# Patient Record
Sex: MALE | Race: WHITE | Employment: OTHER | URBAN - METROPOLITAN AREA
[De-identification: names, ages, dates, MRNs, and addresses within clinical notes are randomized per-mention and may not be internally consistent; named-entity substitution may affect disease eponyms.]

---

## 2017-01-12 ENCOUNTER — HOSPITAL ENCOUNTER (OUTPATIENT)
Dept: PREADMISSION TESTING | Age: 27
Discharge: HOME | End: 2017-01-12
Payer: COMMERCIAL

## 2017-01-12 VITALS
WEIGHT: 194 LBS | RESPIRATION RATE: 20 BRPM | OXYGEN SATURATION: 98 % | HEART RATE: 90 BPM | SYSTOLIC BLOOD PRESSURE: 122 MMHG | HEIGHT: 72 IN | DIASTOLIC BLOOD PRESSURE: 75 MMHG | BODY MASS INDEX: 26.28 KG/M2 | TEMPERATURE: 98 F

## 2017-01-12 RX ORDER — LIDOCAINE HYDROCHLORIDE 10 MG/ML
1 INJECTION, SOLUTION EPIDURAL; INFILTRATION; INTRACAUDAL; PERINEURAL
Status: CANCELLED | OUTPATIENT
Start: 2017-01-12 | End: 2017-01-12

## 2017-01-12 RX ORDER — SODIUM CHLORIDE 0.9 % (FLUSH) 0.9 %
10 SYRINGE (ML) INJECTION EVERY 12 HOURS SCHEDULED
Status: CANCELLED | OUTPATIENT
Start: 2017-01-12

## 2017-01-12 RX ORDER — SODIUM CHLORIDE 0.9 % (FLUSH) 0.9 %
10 SYRINGE (ML) INJECTION PRN
Status: CANCELLED | OUTPATIENT
Start: 2017-01-12

## 2017-01-12 RX ORDER — SODIUM CHLORIDE, SODIUM LACTATE, POTASSIUM CHLORIDE, CALCIUM CHLORIDE 600; 310; 30; 20 MG/100ML; MG/100ML; MG/100ML; MG/100ML
INJECTION, SOLUTION INTRAVENOUS CONTINUOUS
Status: CANCELLED | OUTPATIENT
Start: 2017-01-12

## 2017-01-17 ENCOUNTER — ANESTHESIA (OUTPATIENT)
Dept: OPERATING ROOM | Age: 27
End: 2017-01-17
Payer: COMMERCIAL

## 2017-01-17 ENCOUNTER — HOSPITAL ENCOUNTER (OUTPATIENT)
Age: 27
Setting detail: OUTPATIENT SURGERY
Discharge: HOME | End: 2017-01-17
Attending: ORTHOPAEDIC SURGERY | Admitting: ORTHOPAEDIC SURGERY
Payer: COMMERCIAL

## 2017-01-17 ENCOUNTER — ANESTHESIA EVENT (OUTPATIENT)
Dept: OPERATING ROOM | Age: 27
End: 2017-01-17
Payer: COMMERCIAL

## 2017-01-17 VITALS
RESPIRATION RATE: 16 BRPM | DIASTOLIC BLOOD PRESSURE: 61 MMHG | WEIGHT: 194 LBS | BODY MASS INDEX: 26.28 KG/M2 | OXYGEN SATURATION: 95 % | SYSTOLIC BLOOD PRESSURE: 109 MMHG | TEMPERATURE: 97.7 F | HEART RATE: 76 BPM | HEIGHT: 72 IN

## 2017-01-17 VITALS — SYSTOLIC BLOOD PRESSURE: 96 MMHG | OXYGEN SATURATION: 96 % | DIASTOLIC BLOOD PRESSURE: 46 MMHG | TEMPERATURE: 96.1 F

## 2017-01-17 PROCEDURE — 3600000004 HC SURGERY LEVEL 4 BASE: Performed by: ORTHOPAEDIC SURGERY

## 2017-01-17 PROCEDURE — 7100000011 HC PHASE II RECOVERY - ADDTL 15 MIN: Performed by: ORTHOPAEDIC SURGERY

## 2017-01-17 PROCEDURE — 97161 PT EVAL LOW COMPLEX 20 MIN: CPT

## 2017-01-17 PROCEDURE — 7100000010 HC PHASE II RECOVERY - FIRST 15 MIN: Performed by: ORTHOPAEDIC SURGERY

## 2017-01-17 PROCEDURE — 2500000003 HC RX 250 WO HCPCS: Performed by: NURSE ANESTHETIST, CERTIFIED REGISTERED

## 2017-01-17 PROCEDURE — 7100000000 HC PACU RECOVERY - FIRST 15 MIN: Performed by: ORTHOPAEDIC SURGERY

## 2017-01-17 PROCEDURE — 6360000002 HC RX W HCPCS: Performed by: ORTHOPAEDIC SURGERY

## 2017-01-17 PROCEDURE — 3600000014 HC SURGERY LEVEL 4 ADDTL 15MIN: Performed by: ORTHOPAEDIC SURGERY

## 2017-01-17 PROCEDURE — 6360000002 HC RX W HCPCS: Performed by: NURSE ANESTHETIST, CERTIFIED REGISTERED

## 2017-01-17 PROCEDURE — 3700000001 HC ADD 15 MINUTES (ANESTHESIA): Performed by: ORTHOPAEDIC SURGERY

## 2017-01-17 PROCEDURE — 3700000000 HC ANESTHESIA ATTENDED CARE: Performed by: ORTHOPAEDIC SURGERY

## 2017-01-17 PROCEDURE — 2580000003 HC RX 258: Performed by: ORTHOPAEDIC SURGERY

## 2017-01-17 PROCEDURE — 7100000001 HC PACU RECOVERY - ADDTL 15 MIN: Performed by: ORTHOPAEDIC SURGERY

## 2017-01-17 PROCEDURE — 2500000003 HC RX 250 WO HCPCS: Performed by: ORTHOPAEDIC SURGERY

## 2017-01-17 PROCEDURE — 2580000003 HC RX 258: Performed by: NURSE ANESTHETIST, CERTIFIED REGISTERED

## 2017-01-17 PROCEDURE — 2500000003 HC RX 250 WO HCPCS: Performed by: STUDENT IN AN ORGANIZED HEALTH CARE EDUCATION/TRAINING PROGRAM

## 2017-01-17 PROCEDURE — 6360000002 HC RX W HCPCS: Performed by: ANESTHESIOLOGY

## 2017-01-17 PROCEDURE — 2580000003 HC RX 258: Performed by: STUDENT IN AN ORGANIZED HEALTH CARE EDUCATION/TRAINING PROGRAM

## 2017-01-17 RX ORDER — FENTANYL CITRATE 50 UG/ML
50 INJECTION, SOLUTION INTRAMUSCULAR; INTRAVENOUS EVERY 10 MIN PRN
Status: DISCONTINUED | OUTPATIENT
Start: 2017-01-17 | End: 2017-01-17 | Stop reason: HOSPADM

## 2017-01-17 RX ORDER — BUPIVACAINE HYDROCHLORIDE AND EPINEPHRINE 5; 5 MG/ML; UG/ML
INJECTION, SOLUTION EPIDURAL; INTRACAUDAL; PERINEURAL PRN
Status: DISCONTINUED | OUTPATIENT
Start: 2017-01-17 | End: 2017-01-17 | Stop reason: HOSPADM

## 2017-01-17 RX ORDER — LIDOCAINE HYDROCHLORIDE 20 MG/ML
INJECTION, SOLUTION INFILTRATION; PERINEURAL PRN
Status: DISCONTINUED | OUTPATIENT
Start: 2017-01-17 | End: 2017-01-17 | Stop reason: SDUPTHER

## 2017-01-17 RX ORDER — HYDROCODONE BITARTRATE AND ACETAMINOPHEN 5; 325 MG/1; MG/1
1 TABLET ORAL PRN
Status: DISCONTINUED | OUTPATIENT
Start: 2017-01-17 | End: 2017-01-17 | Stop reason: HOSPADM

## 2017-01-17 RX ORDER — SODIUM CHLORIDE 0.9 % (FLUSH) 0.9 %
10 SYRINGE (ML) INJECTION EVERY 12 HOURS SCHEDULED
Status: DISCONTINUED | OUTPATIENT
Start: 2017-01-17 | End: 2017-01-17 | Stop reason: HOSPADM

## 2017-01-17 RX ORDER — MEPERIDINE HYDROCHLORIDE 25 MG/ML
12.5 INJECTION INTRAMUSCULAR; INTRAVENOUS; SUBCUTANEOUS EVERY 5 MIN PRN
Status: DISCONTINUED | OUTPATIENT
Start: 2017-01-17 | End: 2017-01-17 | Stop reason: HOSPADM

## 2017-01-17 RX ORDER — SUCCINYLCHOLINE CHLORIDE 20 MG/ML
INJECTION INTRAMUSCULAR; INTRAVENOUS PRN
Status: DISCONTINUED | OUTPATIENT
Start: 2017-01-17 | End: 2017-01-17 | Stop reason: SDUPTHER

## 2017-01-17 RX ORDER — HYDROCODONE BITARTRATE AND ACETAMINOPHEN 5; 325 MG/1; MG/1
2 TABLET ORAL PRN
Status: DISCONTINUED | OUTPATIENT
Start: 2017-01-17 | End: 2017-01-17 | Stop reason: HOSPADM

## 2017-01-17 RX ORDER — METOCLOPRAMIDE HYDROCHLORIDE 5 MG/ML
10 INJECTION INTRAMUSCULAR; INTRAVENOUS
Status: DISCONTINUED | OUTPATIENT
Start: 2017-01-17 | End: 2017-01-17 | Stop reason: HOSPADM

## 2017-01-17 RX ORDER — DEXAMETHASONE SODIUM PHOSPHATE 10 MG/ML
INJECTION INTRAMUSCULAR; INTRAVENOUS PRN
Status: DISCONTINUED | OUTPATIENT
Start: 2017-01-17 | End: 2017-01-17 | Stop reason: SDUPTHER

## 2017-01-17 RX ORDER — SODIUM CHLORIDE, SODIUM LACTATE, POTASSIUM CHLORIDE, CALCIUM CHLORIDE 600; 310; 30; 20 MG/100ML; MG/100ML; MG/100ML; MG/100ML
INJECTION, SOLUTION INTRAVENOUS CONTINUOUS PRN
Status: DISCONTINUED | OUTPATIENT
Start: 2017-01-17 | End: 2017-01-17 | Stop reason: SDUPTHER

## 2017-01-17 RX ORDER — ACETAMINOPHEN 325 MG/1
650 TABLET ORAL EVERY 4 HOURS PRN
Status: CANCELLED | OUTPATIENT
Start: 2017-01-17

## 2017-01-17 RX ORDER — MORPHINE SULFATE 4 MG/ML
4 INJECTION, SOLUTION INTRAMUSCULAR; INTRAVENOUS
Status: CANCELLED | OUTPATIENT
Start: 2017-01-17

## 2017-01-17 RX ORDER — ONDANSETRON 2 MG/ML
INJECTION INTRAMUSCULAR; INTRAVENOUS PRN
Status: DISCONTINUED | OUTPATIENT
Start: 2017-01-17 | End: 2017-01-17 | Stop reason: SDUPTHER

## 2017-01-17 RX ORDER — PROPOFOL 10 MG/ML
INJECTION, EMULSION INTRAVENOUS PRN
Status: DISCONTINUED | OUTPATIENT
Start: 2017-01-17 | End: 2017-01-17 | Stop reason: SDUPTHER

## 2017-01-17 RX ORDER — SODIUM CHLORIDE 0.9 % (FLUSH) 0.9 %
10 SYRINGE (ML) INJECTION EVERY 12 HOURS SCHEDULED
Status: CANCELLED | OUTPATIENT
Start: 2017-01-17

## 2017-01-17 RX ORDER — OXYCODONE HYDROCHLORIDE AND ACETAMINOPHEN 5; 325 MG/1; MG/1
1 TABLET ORAL EVERY 4 HOURS PRN
Status: CANCELLED | OUTPATIENT
Start: 2017-01-17

## 2017-01-17 RX ORDER — OXYCODONE HYDROCHLORIDE AND ACETAMINOPHEN 5; 325 MG/1; MG/1
2 TABLET ORAL EVERY 4 HOURS PRN
Status: CANCELLED | OUTPATIENT
Start: 2017-01-17

## 2017-01-17 RX ORDER — MAGNESIUM HYDROXIDE 1200 MG/15ML
LIQUID ORAL CONTINUOUS PRN
Status: DISCONTINUED | OUTPATIENT
Start: 2017-01-17 | End: 2017-01-17 | Stop reason: HOSPADM

## 2017-01-17 RX ORDER — FENTANYL CITRATE 50 UG/ML
INJECTION, SOLUTION INTRAMUSCULAR; INTRAVENOUS PRN
Status: DISCONTINUED | OUTPATIENT
Start: 2017-01-17 | End: 2017-01-17 | Stop reason: SDUPTHER

## 2017-01-17 RX ORDER — DIPHENHYDRAMINE HYDROCHLORIDE 50 MG/ML
12.5 INJECTION INTRAMUSCULAR; INTRAVENOUS
Status: DISCONTINUED | OUTPATIENT
Start: 2017-01-17 | End: 2017-01-17 | Stop reason: HOSPADM

## 2017-01-17 RX ORDER — SODIUM CHLORIDE 0.9 % (FLUSH) 0.9 %
10 SYRINGE (ML) INJECTION PRN
Status: DISCONTINUED | OUTPATIENT
Start: 2017-01-17 | End: 2017-01-17 | Stop reason: HOSPADM

## 2017-01-17 RX ORDER — GLYCOPYRROLATE 0.2 MG/ML
INJECTION INTRAMUSCULAR; INTRAVENOUS PRN
Status: DISCONTINUED | OUTPATIENT
Start: 2017-01-17 | End: 2017-01-17 | Stop reason: SDUPTHER

## 2017-01-17 RX ORDER — ONDANSETRON 2 MG/ML
4 INJECTION INTRAMUSCULAR; INTRAVENOUS
Status: DISCONTINUED | OUTPATIENT
Start: 2017-01-17 | End: 2017-01-17 | Stop reason: HOSPADM

## 2017-01-17 RX ORDER — MORPHINE SULFATE 2 MG/ML
2 INJECTION, SOLUTION INTRAMUSCULAR; INTRAVENOUS
Status: CANCELLED | OUTPATIENT
Start: 2017-01-17

## 2017-01-17 RX ORDER — KETOROLAC TROMETHAMINE 30 MG/ML
INJECTION, SOLUTION INTRAMUSCULAR; INTRAVENOUS PRN
Status: DISCONTINUED | OUTPATIENT
Start: 2017-01-17 | End: 2017-01-17 | Stop reason: SDUPTHER

## 2017-01-17 RX ORDER — LIDOCAINE HYDROCHLORIDE 10 MG/ML
1 INJECTION, SOLUTION EPIDURAL; INFILTRATION; INTRACAUDAL; PERINEURAL
Status: COMPLETED | OUTPATIENT
Start: 2017-01-17 | End: 2017-01-17

## 2017-01-17 RX ORDER — SODIUM CHLORIDE, SODIUM LACTATE, POTASSIUM CHLORIDE, CALCIUM CHLORIDE 600; 310; 30; 20 MG/100ML; MG/100ML; MG/100ML; MG/100ML
INJECTION, SOLUTION INTRAVENOUS CONTINUOUS
Status: DISCONTINUED | OUTPATIENT
Start: 2017-01-17 | End: 2017-01-17 | Stop reason: HOSPADM

## 2017-01-17 RX ORDER — MIDAZOLAM HYDROCHLORIDE 1 MG/ML
INJECTION INTRAMUSCULAR; INTRAVENOUS PRN
Status: DISCONTINUED | OUTPATIENT
Start: 2017-01-17 | End: 2017-01-17 | Stop reason: SDUPTHER

## 2017-01-17 RX ORDER — SODIUM CHLORIDE 0.9 % (FLUSH) 0.9 %
10 SYRINGE (ML) INJECTION PRN
Status: CANCELLED | OUTPATIENT
Start: 2017-01-17

## 2017-01-17 RX ORDER — ONDANSETRON 2 MG/ML
4 INJECTION INTRAMUSCULAR; INTRAVENOUS EVERY 6 HOURS PRN
Status: CANCELLED | OUTPATIENT
Start: 2017-01-17

## 2017-01-17 RX ADMIN — PROPOFOL 50 MG: 10 INJECTION, EMULSION INTRAVENOUS at 07:52

## 2017-01-17 RX ADMIN — FENTANYL CITRATE 25 MCG: 50 INJECTION, SOLUTION INTRAMUSCULAR; INTRAVENOUS at 07:52

## 2017-01-17 RX ADMIN — LIDOCAINE HYDROCHLORIDE 100 MG: 20 INJECTION, SOLUTION INFILTRATION; PERINEURAL at 07:31

## 2017-01-17 RX ADMIN — LIDOCAINE HYDROCHLORIDE 0.1 ML: 10 INJECTION, SOLUTION EPIDURAL; INFILTRATION; INTRACAUDAL; PERINEURAL at 06:40

## 2017-01-17 RX ADMIN — FENTANYL CITRATE 50 MCG: 50 INJECTION, SOLUTION INTRAMUSCULAR; INTRAVENOUS at 07:31

## 2017-01-17 RX ADMIN — KETOROLAC TROMETHAMINE 30 MG: 30 INJECTION, SOLUTION INTRAMUSCULAR; INTRAVENOUS at 07:35

## 2017-01-17 RX ADMIN — SODIUM CHLORIDE, POTASSIUM CHLORIDE, SODIUM LACTATE AND CALCIUM CHLORIDE: 600; 310; 30; 20 INJECTION, SOLUTION INTRAVENOUS at 06:39

## 2017-01-17 RX ADMIN — FENTANYL CITRATE 25 MCG: 50 INJECTION, SOLUTION INTRAMUSCULAR; INTRAVENOUS at 08:04

## 2017-01-17 RX ADMIN — GLYCOPYRROLATE 0.1 MG: 0.2 INJECTION INTRAMUSCULAR; INTRAVENOUS at 07:39

## 2017-01-17 RX ADMIN — SUCCINYLCHOLINE CHLORIDE 20 MG: 20 INJECTION, SOLUTION INTRAMUSCULAR; INTRAVENOUS at 08:29

## 2017-01-17 RX ADMIN — SODIUM CHLORIDE, POTASSIUM CHLORIDE, SODIUM LACTATE AND CALCIUM CHLORIDE: 600; 310; 30; 20 INJECTION, SOLUTION INTRAVENOUS at 07:18

## 2017-01-17 RX ADMIN — FENTANYL CITRATE 25 MCG: 50 INJECTION, SOLUTION INTRAMUSCULAR; INTRAVENOUS at 07:41

## 2017-01-17 RX ADMIN — SODIUM CHLORIDE, POTASSIUM CHLORIDE, SODIUM LACTATE AND CALCIUM CHLORIDE: 600; 310; 30; 20 INJECTION, SOLUTION INTRAVENOUS at 08:00

## 2017-01-17 RX ADMIN — MIDAZOLAM HYDROCHLORIDE 2 MG: 1 INJECTION, SOLUTION INTRAMUSCULAR; INTRAVENOUS at 07:25

## 2017-01-17 RX ADMIN — Medication 2 G: at 07:29

## 2017-01-17 RX ADMIN — PROPOFOL 50 MG: 10 INJECTION, EMULSION INTRAVENOUS at 07:41

## 2017-01-17 RX ADMIN — PROPOFOL 200 MG: 10 INJECTION, EMULSION INTRAVENOUS at 07:31

## 2017-01-17 RX ADMIN — DEXAMETHASONE SODIUM PHOSPHATE 8 MG: 10 INJECTION INTRAMUSCULAR; INTRAVENOUS at 07:35

## 2017-01-17 RX ADMIN — HYDROMORPHONE HYDROCHLORIDE 0.5 MG: 1 INJECTION, SOLUTION INTRAMUSCULAR; INTRAVENOUS; SUBCUTANEOUS at 09:25

## 2017-01-17 RX ADMIN — ONDANSETRON 4 MG: 2 INJECTION, SOLUTION INTRAMUSCULAR; INTRAVENOUS at 07:35

## 2017-01-17 RX ADMIN — HYDROMORPHONE HYDROCHLORIDE 0.5 MG: 1 INJECTION, SOLUTION INTRAMUSCULAR; INTRAVENOUS; SUBCUTANEOUS at 09:15

## 2017-01-17 ASSESSMENT — PAIN SCALES - GENERAL
PAINLEVEL_OUTOF10: 0
PAINLEVEL_OUTOF10: 4
PAINLEVEL_OUTOF10: 6
PAINLEVEL_OUTOF10: 3
PAINLEVEL_OUTOF10: 0

## 2017-01-17 ASSESSMENT — PAIN - FUNCTIONAL ASSESSMENT: PAIN_FUNCTIONAL_ASSESSMENT: 0-10

## 2017-02-28 ENCOUNTER — APPOINTMENT (OUTPATIENT)
Dept: GENERAL RADIOLOGY | Age: 27
End: 2017-02-28
Payer: OTHER MISCELLANEOUS

## 2017-02-28 ENCOUNTER — HOSPITAL ENCOUNTER (EMERGENCY)
Age: 27
Discharge: HOME | End: 2017-02-28
Payer: OTHER MISCELLANEOUS

## 2017-02-28 VITALS
DIASTOLIC BLOOD PRESSURE: 84 MMHG | SYSTOLIC BLOOD PRESSURE: 126 MMHG | WEIGHT: 190 LBS | TEMPERATURE: 97.9 F | RESPIRATION RATE: 17 BRPM | HEART RATE: 66 BPM | OXYGEN SATURATION: 100 % | HEIGHT: 72 IN | BODY MASS INDEX: 25.73 KG/M2

## 2017-02-28 DIAGNOSIS — S16.1XXA NECK STRAIN, INITIAL ENCOUNTER: ICD-10-CM

## 2017-02-28 DIAGNOSIS — S29.019A THORACIC MYOFASCIAL STRAIN, INITIAL ENCOUNTER: ICD-10-CM

## 2017-02-28 DIAGNOSIS — S89.92XA LEFT KNEE INJURY, INITIAL ENCOUNTER: ICD-10-CM
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
## 2017-02-28 PROCEDURE — 99284 EMERGENCY DEPT VISIT MOD MDM: CPT

## 2017-02-28 PROCEDURE — 72072 X-RAY EXAM THORAC SPINE 3VWS: CPT

## 2017-02-28 PROCEDURE — 96372 THER/PROPH/DIAG INJ SC/IM: CPT

## 2017-02-28 PROCEDURE — 72050 X-RAY EXAM NECK SPINE 4/5VWS: CPT

## 2017-02-28 PROCEDURE — 73564 X-RAY EXAM KNEE 4 OR MORE: CPT

## 2017-02-28 PROCEDURE — 73590 X-RAY EXAM OF LOWER LEG: CPT

## 2017-02-28 PROCEDURE — 6360000002 HC RX W HCPCS: Performed by: PHYSICIAN ASSISTANT

## 2017-02-28 RX ORDER — KETOROLAC TROMETHAMINE 30 MG/ML
60 INJECTION, SOLUTION INTRAMUSCULAR; INTRAVENOUS ONCE
Status: COMPLETED | OUTPATIENT
Start: 2017-02-28 | End: 2017-02-28

## 2017-02-28 RX ORDER — ORPHENADRINE CITRATE 30 MG/ML
60 INJECTION INTRAMUSCULAR; INTRAVENOUS ONCE
Status: COMPLETED | OUTPATIENT
Start: 2017-02-28 | End: 2017-02-28

## 2017-02-28 RX ORDER — IBUPROFEN 800 MG/1
800 TABLET ORAL EVERY 6 HOURS PRN
Qty: 12 TABLET | Refills: 0 | Status: SHIPPED | OUTPATIENT
Start: 2017-02-28 | End: 2017-03-02

## 2017-02-28 RX ORDER — TIZANIDINE 4 MG/1
4 TABLET ORAL NIGHTLY PRN
Qty: 4 TABLET | Refills: 0 | Status: SHIPPED | OUTPATIENT
Start: 2017-02-28 | End: 2017-03-02 | Stop reason: SDUPTHER

## 2017-02-28 RX ADMIN — ORPHENADRINE CITRATE 60 MG: 30 INJECTION INTRAMUSCULAR; INTRAVENOUS at 12:16

## 2017-02-28 RX ADMIN — KETOROLAC TROMETHAMINE 60 MG: 60 INJECTION, SOLUTION INTRAMUSCULAR at 12:16

## 2017-02-28 ASSESSMENT — PAIN DESCRIPTION - FREQUENCY: FREQUENCY: CONTINUOUS

## 2017-02-28 ASSESSMENT — PAIN SCALES - GENERAL
PAINLEVEL_OUTOF10: 8
PAINLEVEL_OUTOF10: 6

## 2017-02-28 ASSESSMENT — ENCOUNTER SYMPTOMS: BACK PAIN: 1

## 2017-02-28 ASSESSMENT — PAIN DESCRIPTION - DESCRIPTORS
DESCRIPTORS: ACHING
DESCRIPTORS: SORE;ACHING

## 2017-02-28 ASSESSMENT — PAIN DESCRIPTION - LOCATION
LOCATION: BACK;NECK
LOCATION: BACK;KNEE

## 2017-02-28 ASSESSMENT — PAIN DESCRIPTION - PAIN TYPE: TYPE: ACUTE PAIN

## 2017-02-28 ASSESSMENT — PAIN DESCRIPTION - PROGRESSION: CLINICAL_PROGRESSION: NOT CHANGED

## 2017-03-02 ENCOUNTER — OFFICE VISIT (OUTPATIENT)
Dept: FAMILY MEDICINE CLINIC | Age: 27
End: 2017-03-02

## 2017-03-02 VITALS
TEMPERATURE: 98.7 F | WEIGHT: 198 LBS | HEART RATE: 90 BPM | DIASTOLIC BLOOD PRESSURE: 90 MMHG | BODY MASS INDEX: 26.82 KG/M2 | RESPIRATION RATE: 12 BRPM | SYSTOLIC BLOOD PRESSURE: 140 MMHG | HEIGHT: 72 IN | OXYGEN SATURATION: 98 %

## 2017-03-02 DIAGNOSIS — S23.9XXA THORACIC BACK SPRAIN, INITIAL ENCOUNTER: Primary | ICD-10-CM

## 2017-03-02 DIAGNOSIS — M54.6 ACUTE BILATERAL THORACIC BACK PAIN: ICD-10-CM

## 2017-03-02 DIAGNOSIS — S80.02XA CONTUSION OF LEFT KNEE, INITIAL ENCOUNTER: ICD-10-CM
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
## 2017-03-02 DIAGNOSIS — M25.562 ACUTE PAIN OF LEFT KNEE: ICD-10-CM

## 2017-03-02 DIAGNOSIS — M54.2 NECK PAIN, ACUTE: ICD-10-CM

## 2017-03-02 DIAGNOSIS — S16.1XXA CERVICAL STRAIN, ACUTE, INITIAL ENCOUNTER: ICD-10-CM

## 2017-03-02 PROCEDURE — 99213 OFFICE O/P EST LOW 20 MIN: CPT | Performed by: FAMILY MEDICINE

## 2017-03-02 RX ORDER — TIZANIDINE 4 MG/1
4 TABLET ORAL NIGHTLY PRN
Qty: 30 TABLET | Refills: 2 | Status: SHIPPED | OUTPATIENT
Start: 2017-03-02 | End: 2017-06-19 | Stop reason: SDUPTHER

## 2017-03-02 RX ORDER — ETODOLAC 400 MG/1
400 TABLET, FILM COATED ORAL 2 TIMES DAILY
Qty: 60 TABLET | Refills: 3 | Status: SHIPPED | OUTPATIENT
Start: 2017-03-02 | End: 2017-06-19

## 2017-03-02 ASSESSMENT — ENCOUNTER SYMPTOMS
ABDOMINAL PAIN: 0
VISUAL CHANGE: 0
ALLERGIC/IMMUNOLOGIC NEGATIVE: 1
BACK PAIN: 1
GASTROINTESTINAL NEGATIVE: 1
PHOTOPHOBIA: 0
RESPIRATORY NEGATIVE: 1
EYES NEGATIVE: 1
BOWEL INCONTINENCE: 0
TROUBLE SWALLOWING: 0

## 2017-06-19 ENCOUNTER — OFFICE VISIT (OUTPATIENT)
Dept: FAMILY MEDICINE CLINIC | Age: 27
End: 2017-06-19

## 2017-06-19 VITALS
HEART RATE: 100 BPM | TEMPERATURE: 98.5 F | BODY MASS INDEX: 26.41 KG/M2 | WEIGHT: 195 LBS | SYSTOLIC BLOOD PRESSURE: 136 MMHG | OXYGEN SATURATION: 98 % | DIASTOLIC BLOOD PRESSURE: 80 MMHG | RESPIRATION RATE: 16 BRPM | HEIGHT: 72 IN

## 2017-06-19 DIAGNOSIS — M54.42 LEFT-SIDED LOW BACK PAIN WITH LEFT-SIDED SCIATICA, UNSPECIFIED CHRONICITY: Primary | ICD-10-CM

## 2017-06-19 DIAGNOSIS — K21.9 GASTROESOPHAGEAL REFLUX DISEASE, ESOPHAGITIS PRESENCE NOT SPECIFIED: ICD-10-CM

## 2017-06-19 PROCEDURE — 99213 OFFICE O/P EST LOW 20 MIN: CPT | Performed by: FAMILY MEDICINE

## 2017-06-19 RX ORDER — PREDNISONE 10 MG/1
30 TABLET ORAL DAILY
Qty: 30 TABLET | Refills: 0 | Status: SHIPPED | OUTPATIENT
Start: 2017-06-19 | End: 2017-06-29

## 2017-06-19 RX ORDER — RANITIDINE 150 MG/1
150 TABLET ORAL 2 TIMES DAILY
Qty: 60 TABLET | Refills: 5 | Status: SHIPPED | OUTPATIENT
Start: 2017-06-19

## 2017-06-19 RX ORDER — TIZANIDINE 4 MG/1
4 TABLET ORAL NIGHTLY PRN
Qty: 30 TABLET | Refills: 2 | Status: SHIPPED | OUTPATIENT
Start: 2017-06-19 | End: 2017-11-15 | Stop reason: SDUPTHER

## 2017-06-19 ASSESSMENT — PATIENT HEALTH QUESTIONNAIRE - PHQ9
1. LITTLE INTEREST OR PLEASURE IN DOING THINGS: 0
2. FEELING DOWN, DEPRESSED OR HOPELESS: 0
SUM OF ALL RESPONSES TO PHQ9 QUESTIONS 1 & 2: 0
SUM OF ALL RESPONSES TO PHQ QUESTIONS 1-9: 0

## 2017-06-19 ASSESSMENT — ENCOUNTER SYMPTOMS
BOWEL INCONTINENCE: 0
ABDOMINAL PAIN: 0
BACK PAIN: 1

## 2017-06-29 ENCOUNTER — HOSPITAL ENCOUNTER (OUTPATIENT)
Dept: PHYSICAL THERAPY | Age: 27
Setting detail: THERAPIES SERIES
Discharge: HOME | End: 2017-06-29
Payer: COMMERCIAL

## 2017-06-29 PROCEDURE — 97162 PT EVAL MOD COMPLEX 30 MIN: CPT

## 2017-06-29 ASSESSMENT — PAIN SCALES - GENERAL: PAINLEVEL_OUTOF10: 5

## 2017-06-29 ASSESSMENT — PAIN DESCRIPTION - ORIENTATION: ORIENTATION: LOWER;LEFT

## 2017-06-29 ASSESSMENT — PAIN DESCRIPTION - LOCATION: LOCATION: BACK

## 2017-07-10 ENCOUNTER — HOSPITAL ENCOUNTER (OUTPATIENT)
Dept: PHYSICAL THERAPY | Age: 27
Setting detail: THERAPIES SERIES
Discharge: HOME | End: 2017-07-10
Payer: COMMERCIAL

## 2017-07-10 PROCEDURE — 97110 THERAPEUTIC EXERCISES: CPT

## 2017-07-10 PROCEDURE — 97140 MANUAL THERAPY 1/> REGIONS: CPT

## 2017-07-10 ASSESSMENT — PAIN DESCRIPTION - PAIN TYPE: TYPE: CHRONIC PAIN

## 2017-07-10 ASSESSMENT — PAIN DESCRIPTION - ORIENTATION: ORIENTATION: LEFT

## 2017-07-10 ASSESSMENT — PAIN SCALES - GENERAL: PAINLEVEL_OUTOF10: 4

## 2017-07-10 ASSESSMENT — PAIN DESCRIPTION - LOCATION: LOCATION: BACK;HIP

## 2017-07-12 ENCOUNTER — HOSPITAL ENCOUNTER (OUTPATIENT)
Dept: PHYSICAL THERAPY | Age: 27
Setting detail: THERAPIES SERIES
Discharge: HOME | End: 2017-07-12
Payer: COMMERCIAL

## 2017-07-12 PROCEDURE — 97110 THERAPEUTIC EXERCISES: CPT

## 2017-07-12 PROCEDURE — 97140 MANUAL THERAPY 1/> REGIONS: CPT

## 2017-07-12 ASSESSMENT — PAIN DESCRIPTION - FREQUENCY: FREQUENCY: CONTINUOUS

## 2017-07-12 ASSESSMENT — PAIN DESCRIPTION - LOCATION: LOCATION: BACK;BUTTOCKS;HIP

## 2017-07-12 ASSESSMENT — PAIN DESCRIPTION - DESCRIPTORS: DESCRIPTORS: NUMBNESS;TINGLING;STABBING

## 2017-07-12 ASSESSMENT — PAIN DESCRIPTION - PAIN TYPE: TYPE: CHRONIC PAIN

## 2017-07-12 ASSESSMENT — PAIN DESCRIPTION - ORIENTATION: ORIENTATION: LEFT

## 2017-07-12 ASSESSMENT — PAIN SCALES - GENERAL: PAINLEVEL_OUTOF10: 8

## 2017-07-18 ENCOUNTER — HOSPITAL ENCOUNTER (OUTPATIENT)
Dept: PHYSICAL THERAPY | Age: 27
Setting detail: THERAPIES SERIES
Discharge: HOME | End: 2017-07-18
Payer: COMMERCIAL

## 2017-07-25 ENCOUNTER — HOSPITAL ENCOUNTER (OUTPATIENT)
Dept: PHYSICAL THERAPY | Age: 27
Setting detail: THERAPIES SERIES
Discharge: HOME | End: 2017-07-25
Payer: COMMERCIAL

## 2017-07-27 ENCOUNTER — HOSPITAL ENCOUNTER (OUTPATIENT)
Dept: PHYSICAL THERAPY | Age: 27
Setting detail: THERAPIES SERIES
End: 2017-07-27
Payer: COMMERCIAL

## 2017-07-31 ENCOUNTER — HOSPITAL ENCOUNTER (OUTPATIENT)
Dept: PHYSICAL THERAPY | Age: 27
Setting detail: THERAPIES SERIES
Discharge: HOME | End: 2017-07-31
Payer: COMMERCIAL

## 2017-07-31 PROCEDURE — 97110 THERAPEUTIC EXERCISES: CPT

## 2017-07-31 PROCEDURE — G0283 ELEC STIM OTHER THAN WOUND: HCPCS

## 2017-07-31 PROCEDURE — 97140 MANUAL THERAPY 1/> REGIONS: CPT

## 2017-07-31 ASSESSMENT — PAIN DESCRIPTION - ORIENTATION: ORIENTATION: LOWER;LEFT

## 2017-07-31 ASSESSMENT — PAIN SCALES - GENERAL: PAINLEVEL_OUTOF10: 7

## 2017-07-31 ASSESSMENT — PAIN DESCRIPTION - DESCRIPTORS: DESCRIPTORS: STABBING;NUMBNESS

## 2017-07-31 ASSESSMENT — PAIN DESCRIPTION - LOCATION: LOCATION: BACK;BUTTOCKS;HIP

## 2017-07-31 ASSESSMENT — PAIN DESCRIPTION - PAIN TYPE: TYPE: CHRONIC PAIN

## 2017-08-03 ENCOUNTER — APPOINTMENT (OUTPATIENT)
Dept: PHYSICAL THERAPY | Age: 27
End: 2017-08-03
Payer: COMMERCIAL

## 2017-08-07 ENCOUNTER — HOSPITAL ENCOUNTER (OUTPATIENT)
Dept: PHYSICAL THERAPY | Age: 27
Setting detail: THERAPIES SERIES
Discharge: HOME | End: 2017-08-07
Payer: COMMERCIAL

## 2017-08-07 PROCEDURE — G0283 ELEC STIM OTHER THAN WOUND: HCPCS

## 2017-08-07 PROCEDURE — 97140 MANUAL THERAPY 1/> REGIONS: CPT

## 2017-08-07 PROCEDURE — 97110 THERAPEUTIC EXERCISES: CPT

## 2017-08-07 ASSESSMENT — PAIN DESCRIPTION - LOCATION: LOCATION: BACK;HIP

## 2017-08-07 ASSESSMENT — PAIN DESCRIPTION - ORIENTATION: ORIENTATION: LOWER;LEFT

## 2017-08-07 ASSESSMENT — PAIN DESCRIPTION - PAIN TYPE: TYPE: CHRONIC PAIN

## 2017-08-07 ASSESSMENT — PAIN DESCRIPTION - DESCRIPTORS: DESCRIPTORS: ACHING

## 2017-08-07 ASSESSMENT — PAIN SCALES - GENERAL: PAINLEVEL_OUTOF10: 4

## 2017-08-10 ENCOUNTER — HOSPITAL ENCOUNTER (OUTPATIENT)
Dept: PHYSICAL THERAPY | Age: 27
Setting detail: THERAPIES SERIES
Discharge: HOME | End: 2017-08-10
Payer: COMMERCIAL

## 2017-10-05 ENCOUNTER — CLINICAL DOCUMENTATION (OUTPATIENT)
Dept: PHYSICAL THERAPY | Age: 27
End: 2017-10-05

## 2017-10-05 NOTE — PROGRESS NOTES
Frankie hudson [address]                                                                Ph: [phone]  Fax: [phone]     [] Certification  [] Recertification            []  Plan of Care  [] Progress Note [x] Discharge      To:  Referring Practitioner: Dr. William Cain                 From:  Kelechi Mckee, PT  Patient: Darnell Nathan     : 1990  Diagnosis: Left sided LBP with Left sided sciatica     Date: 10/5/2017  Treatment Diagnosis: LBP     Progress Report Period from:  2017  to 8/15/2017     Total # of Visits to Date: 5   No Show: 1    Canceled Appointment: 3      OBJECTIVE:   Short Term Goals - Time Frame for Short term goals: 2 weeks    Goals Current/Discharge status  Met   Short term goal 1: Independent with HEP  compliant [x] yes  [] no   Short term goal 2: Demonstrates correct pelvic alignment >50% of the time. NT secondary to unexpected D/C [] yes  [] no      Long Term Goals - Time Frame for Long term goals : 4-5 weeks  Goals Current/ Discharge status Met   Long term goal 1: Improve lumbar ROM to WNL to improve ADLs and mobility. Spine  Lumbar: flex 45, ext 28, SB RB 15 degrees(17) [] yes  [x] no   Long term goal 2: Reduce LBP to </= 3/10 with all activity. 4/10 (17) [] yes  [] no   Long term goal 3: Improve daniel LE strength to 5/5 to improve stability and maintain pelvic alignment. NT secondary to unexpected D/C [] yes  [] no   Long term goal 4: Oswestry </ = 10/50 NT secondary to unexpected D/C [] yes  [] no      Assessment: Unable to determine functional outcomes secondary to pt failure to return to PT after 17; unexpected D/C     PLAN: D/C     Patient Status:[] Continue/ Initiate plan of Care                                              [x] Discharge PT. Recommend pt continue with HEP.                                                [] Additional visits requested, Please

## 2017-11-15 ENCOUNTER — OFFICE VISIT (OUTPATIENT)
Dept: FAMILY MEDICINE CLINIC | Age: 27
End: 2017-11-15

## 2017-11-15 VITALS
DIASTOLIC BLOOD PRESSURE: 76 MMHG | HEIGHT: 72 IN | SYSTOLIC BLOOD PRESSURE: 128 MMHG | RESPIRATION RATE: 12 BRPM | TEMPERATURE: 97.9 F | WEIGHT: 206 LBS | BODY MASS INDEX: 27.9 KG/M2

## 2017-11-15 DIAGNOSIS — M54.42 LEFT-SIDED LOW BACK PAIN WITH LEFT-SIDED SCIATICA, UNSPECIFIED CHRONICITY: ICD-10-CM

## 2017-11-15 DIAGNOSIS — B02.9 HERPES ZOSTER WITHOUT COMPLICATION: Primary | ICD-10-CM

## 2017-11-15 PROCEDURE — G8417 CALC BMI ABV UP PARAM F/U: HCPCS | Performed by: FAMILY MEDICINE

## 2017-11-15 PROCEDURE — 99213 OFFICE O/P EST LOW 20 MIN: CPT | Performed by: FAMILY MEDICINE

## 2017-11-15 PROCEDURE — G8484 FLU IMMUNIZE NO ADMIN: HCPCS | Performed by: FAMILY MEDICINE

## 2017-11-15 PROCEDURE — 4004F PT TOBACCO SCREEN RCVD TLK: CPT | Performed by: FAMILY MEDICINE

## 2017-11-15 PROCEDURE — G8427 DOCREV CUR MEDS BY ELIG CLIN: HCPCS | Performed by: FAMILY MEDICINE

## 2017-11-15 RX ORDER — GABAPENTIN 300 MG/1
300 CAPSULE ORAL 3 TIMES DAILY
Qty: 90 CAPSULE | Refills: 0 | Status: SHIPPED | OUTPATIENT
Start: 2017-11-15 | End: 2017-12-21 | Stop reason: SDUPTHER

## 2017-11-15 RX ORDER — TIZANIDINE 4 MG/1
4 TABLET ORAL NIGHTLY PRN
Qty: 30 TABLET | Refills: 2 | Status: SHIPPED | OUTPATIENT
Start: 2017-11-15

## 2017-11-15 RX ORDER — ACYCLOVIR 400 MG/1
TABLET ORAL
Refills: 0 | COMMUNITY
Start: 2017-11-14 | End: 2017-11-15

## 2017-11-15 RX ORDER — ACYCLOVIR 800 MG/1
800 TABLET ORAL
Qty: 35 TABLET | Refills: 0 | Status: SHIPPED | OUTPATIENT
Start: 2017-11-15 | End: 2017-11-22

## 2017-11-15 ASSESSMENT — ENCOUNTER SYMPTOMS
RHINORRHEA: 0
SORE THROAT: 0
VOMITING: 0
EYE PAIN: 0
RESPIRATORY NEGATIVE: 1
COUGH: 0
GASTROINTESTINAL NEGATIVE: 1
DIARRHEA: 0
SHORTNESS OF BREATH: 0
NAIL CHANGES: 0
BACK PAIN: 1

## 2017-11-15 NOTE — PROGRESS NOTES
back, abd     Social History     Social History    Marital status:      Spouse name: N/A    Number of children: N/A    Years of education: N/A     Occupational History    Not on file. Social History Main Topics    Smoking status: Current Every Day Smoker     Packs/day: 1.00    Smokeless tobacco: Never Used    Alcohol use Yes      Comment: Occas    Drug use:      Types: Marijuana      Comment: daily    Sexual activity: Not on file     Other Topics Concern    Not on file     Social History Narrative    No narrative on file     Family History   Problem Relation Age of Onset    Other Mother      thyroid    Mental Illness Mother      bipolar ADH    Mental Illness Father      bipolar, adhd    No Known Problems Daughter      Allergies   Allergen Reactions    Allegra [Fexofenadine] Other (See Comments)     Back pain    Bee Venom     Fexofenadine Hcl     Fexofenadine-Pseudoephed Er     Tape [Adhesive Tape]      Current Outpatient Prescriptions on File Prior to Visit   Medication Sig Dispense Refill    ranitidine (ZANTAC) 150 MG tablet Take 1 tablet by mouth 2 times daily 60 tablet 5    albuterol sulfate HFA (VENTOLIN HFA) 108 (90 BASE) MCG/ACT inhaler Inhale 2 puffs into the lungs every 6 hours as needed for Wheezing (Patient taking differently: Inhale 2 puffs into the lungs every 6 hours as needed for Wheezing Pt to use AM of OR) 1 Inhaler 3    diphenhydrAMINE (BENADRYL) 25 MG capsule Take 50 mg by mouth every 6 hours as needed for Itching. No current facility-administered medications on file prior to visit. Objective    Vitals:    11/15/17 1631   BP: 128/76   Site: Left Arm   Position: Sitting   Cuff Size: Medium Adult   Resp: 12   Temp: 97.9 °F (36.6 °C)   TempSrc: Tympanic   Weight: 206 lb (93.4 kg)   Height: 6' (1.829 m)     Physical Exam   Constitutional: He is oriented to person, place, and time. He appears well-developed and well-nourished. No distress.    HENT:   Head:

## 2017-11-22 ENCOUNTER — TELEPHONE (OUTPATIENT)
Dept: PRIMARY CARE CLINIC | Age: 27
End: 2017-11-22

## 2017-11-27 ENCOUNTER — TELEPHONE (OUTPATIENT)
Dept: FAMILY MEDICINE CLINIC | Age: 27
End: 2017-11-27

## 2017-12-21 ENCOUNTER — OFFICE VISIT (OUTPATIENT)
Dept: FAMILY MEDICINE CLINIC | Age: 27
End: 2017-12-21

## 2017-12-21 VITALS
BODY MASS INDEX: 28.17 KG/M2 | OXYGEN SATURATION: 99 % | DIASTOLIC BLOOD PRESSURE: 70 MMHG | RESPIRATION RATE: 16 BRPM | WEIGHT: 208 LBS | SYSTOLIC BLOOD PRESSURE: 110 MMHG | TEMPERATURE: 99.1 F | HEIGHT: 72 IN | HEART RATE: 86 BPM

## 2017-12-21 DIAGNOSIS — M54.50 CHRONIC BILATERAL LOW BACK PAIN WITHOUT SCIATICA: ICD-10-CM

## 2017-12-21 DIAGNOSIS — B02.29 POSTHERPETIC NEURALGIA: Primary | ICD-10-CM

## 2017-12-21 DIAGNOSIS — G89.29 CHRONIC BILATERAL LOW BACK PAIN WITHOUT SCIATICA: ICD-10-CM

## 2017-12-21 PROCEDURE — G8417 CALC BMI ABV UP PARAM F/U: HCPCS | Performed by: FAMILY MEDICINE

## 2017-12-21 PROCEDURE — 4004F PT TOBACCO SCREEN RCVD TLK: CPT | Performed by: FAMILY MEDICINE

## 2017-12-21 PROCEDURE — G8427 DOCREV CUR MEDS BY ELIG CLIN: HCPCS | Performed by: FAMILY MEDICINE

## 2017-12-21 PROCEDURE — G8484 FLU IMMUNIZE NO ADMIN: HCPCS | Performed by: FAMILY MEDICINE

## 2017-12-21 PROCEDURE — 99213 OFFICE O/P EST LOW 20 MIN: CPT | Performed by: FAMILY MEDICINE

## 2017-12-21 RX ORDER — LIDOCAINE 50 MG/G
2 PATCH TOPICAL DAILY
Qty: 60 PATCH | Refills: 0 | Status: SHIPPED | OUTPATIENT
Start: 2017-12-21

## 2017-12-21 RX ORDER — GABAPENTIN 300 MG/1
300 CAPSULE ORAL 3 TIMES DAILY
Qty: 90 CAPSULE | Refills: 5 | Status: SHIPPED | OUTPATIENT
Start: 2017-12-21

## 2017-12-21 ASSESSMENT — ENCOUNTER SYMPTOMS
BACK PAIN: 1
ABDOMINAL PAIN: 0
BOWEL INCONTINENCE: 0

## 2017-12-21 NOTE — PROGRESS NOTES
Subjective  Daivd Juliette, 32 y.o. male presents today with:  Chief Complaint   Patient presents with    Arm Pain     pain from shingles     Back Pain     wants something stronger for back pain        Back Pain   This is a chronic problem. The current episode started more than 1 month ago. The problem occurs constantly. The problem is unchanged. The pain is present in the sacro-iliac and lumbar spine. The quality of the pain is described as aching. The pain does not radiate. The pain is severe. The pain is the same all the time. The symptoms are aggravated by standing, twisting, position, bending and coughing. Stiffness is present all day. Pertinent negatives include no abdominal pain, bladder incontinence, bowel incontinence, chest pain, dysuria, fever, headaches, leg pain, numbness (Right arm burning paresthesias), paresis, paresthesias, pelvic pain, perianal numbness, tingling, weakness or weight loss. He has tried analgesics, bed rest, heat, ice, muscle relaxant, NSAIDs and home exercises for the symptoms. The treatment provided mild relief. Review of Systems   Constitutional: Negative for fever and weight loss. Cardiovascular: Negative for chest pain. Gastrointestinal: Negative for abdominal pain and bowel incontinence. Genitourinary: Negative for bladder incontinence, dysuria and pelvic pain. Musculoskeletal: Positive for back pain. Neurological: Negative for tingling, weakness, numbness (Right arm burning paresthesias), headaches and paresthesias.          Past Medical History:   Diagnosis Date    ADHD (attention deficit hyperactivity disorder)     Allergic rhinitis     Seasonal allergies    Asthma     Bipolar disorder ([address]     May or May not have    Hyperlipidemia     off tx since weight loss 2 yrs ago    Hypertension     BP normal after weight los 2 yrs ago    PTSD (post-traumatic stress disorder) 2008     Past Surgical History:   Procedure Laterality Date    ARTHROSCOPY / ARTHROTOMY KNEE Left 1/17/2017    LT KNEE ARTHROSCOPY MEDIAL MENISCECTOMY, POSS MENISCUS REPAIR SUPINE ARTHREX MENISCUS REPAIR performed by Caren Tejeda MD at [address]      Right foot, left leg, upper right back, abd     Social History     Social History    Marital status:      Spouse name: N/A    Number of children: N/A    Years of education: N/A     Occupational History    Not on file. Social History Main Topics    Smoking status: Current Every Day Smoker     Packs/day: 1.00    Smokeless tobacco: Never Used    Alcohol use Yes      Comment: Occas    Drug use: Yes     Types: Marijuana      Comment: daily    Sexual activity: Not on file     Other Topics Concern    Not on file     Social History Narrative    No narrative on file     Family History   Problem Relation Age of Onset    Other Mother      thyroid    Mental Illness Mother      bipolar ADH    Mental Illness Father      bipolar, adhd    No Known Problems Daughter      Allergies   Allergen Reactions    Allegra [Fexofenadine] Other (See Comments)     Back pain    Bee Venom     Fexofenadine Hcl     Fexofenadine-Pseudoephed Er     Tape [Adhesive Tape]      Current Outpatient Prescriptions on File Prior to Visit   Medication Sig Dispense Refill    tiZANidine (ZANAFLEX) 4 MG tablet Take 1 tablet by mouth nightly as needed (for severe spasm) 30 tablet 2    Tens Unit MISC by Does not apply route 1 each 0    ranitidine (ZANTAC) 150 MG tablet Take 1 tablet by mouth 2 times daily 60 tablet 5    albuterol sulfate HFA (VENTOLIN HFA) 108 (90 BASE) MCG/ACT inhaler Inhale 2 puffs into the lungs every 6 hours as needed for Wheezing (Patient taking differently: Inhale 2 puffs into the lungs every 6 hours as needed for Wheezing Pt to use AM of OR) 1 Inhaler 3    diphenhydrAMINE (BENADRYL) 25 MG capsule Take 50 mg by mouth every 6 hours as needed for Itching.        No current facility-administered medications on file

## 2018-01-04 ENCOUNTER — TELEPHONE (OUTPATIENT)
Dept: FAMILY MEDICINE CLINIC | Age: 28
End: 2018-01-04

## 2018-01-05 NOTE — TELEPHONE ENCOUNTER
Patient aware and will be contacting DOMINICK Da Silva in [facility] to verify they have correct fax.

## 2019-04-15 ENCOUNTER — TELEPHONE (OUTPATIENT)
Dept: FAMILY MEDICINE CLINIC | Age: 29
End: 2019-04-15

## 2021-07-10 ENCOUNTER — APPOINTMENT (OUTPATIENT)
Dept: GENERAL RADIOLOGY | Age: 31
End: 2021-07-10
Payer: COMMERCIAL

## 2021-07-10 ENCOUNTER — APPOINTMENT (OUTPATIENT)
Dept: CT IMAGING | Age: 31
End: 2021-07-10
Payer: COMMERCIAL

## 2021-07-10 ENCOUNTER — HOSPITAL ENCOUNTER (EMERGENCY)
Age: 31
Discharge: HOME | End: 2021-07-10
Attending: EMERGENCY MEDICINE
Payer: COMMERCIAL

## 2021-07-10 VITALS
DIASTOLIC BLOOD PRESSURE: 76 MMHG | SYSTOLIC BLOOD PRESSURE: 136 MMHG | BODY MASS INDEX: 27.09 KG/M2 | WEIGHT: 200 LBS | RESPIRATION RATE: 18 BRPM | TEMPERATURE: 96.2 F | OXYGEN SATURATION: 100 % | HEART RATE: 88 BPM | HEIGHT: 72 IN
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
## 2021-07-10 DIAGNOSIS — T22.211A PARTIAL THICKNESS BURN OF RIGHT FOREARM, INITIAL ENCOUNTER: ICD-10-CM

## 2021-07-10 DIAGNOSIS — S49.92XA INJURY OF LEFT ACROMIOCLAVICULAR JOINT, INITIAL ENCOUNTER: ICD-10-CM

## 2021-07-10 LAB
ALBUMIN SERPL-MCNC: 4.9 G/DL (ref 3.5–4.6)
ALP BLD-CCNC: 79 U/L (ref 35–104)
ALT SERPL-CCNC: 17 U/L (ref 0–41)
ANION GAP SERPL CALCULATED.3IONS-SCNC: 15 MEQ/L (ref 9–15)
AST SERPL-CCNC: 19 U/L (ref 0–40)
BASOPHILS ABSOLUTE: 0.1 K/UL (ref 0–0.1)
BASOPHILS RELATIVE PERCENT: 0.5 % (ref 0.2–1.2)
BILIRUB SERPL-MCNC: 0.4 MG/DL (ref 0.2–0.7)
BUN BLDV-MCNC: 18 MG/DL (ref 6–20)
CALCIUM SERPL-MCNC: 9.2 MG/DL (ref 8.5–9.9)
CHLORIDE BLD-SCNC: 105 MEQ/L (ref 95–107)
CO2: 22 MEQ/L (ref 20–31)
CREAT SERPL-MCNC: 1.02 MG/DL (ref 0.7–1.2)
EOSINOPHILS ABSOLUTE: 0.3 K/UL (ref 0–0.5)
EOSINOPHILS RELATIVE PERCENT: 1.9 % (ref 0.8–7)
GFR AFRICAN AMERICAN: >60
GFR NON-AFRICAN AMERICAN: >60
GLOBULIN: 2.6 G/DL (ref 2.3–3.5)
GLUCOSE BLD-MCNC: 107 MG/DL (ref 70–99)
HCT VFR BLD CALC: 41.3 % (ref 42–52)
HEMOGLOBIN: 14.2 G/DL (ref 13.7–17.5)
IMMATURE GRANULOCYTES #: 0 K/UL
IMMATURE GRANULOCYTES %: 0.3 %
LYMPHOCYTES ABSOLUTE: 6.3 K/UL (ref 1.3–3.6)
LYMPHOCYTES RELATIVE PERCENT: 47.6 %
MCH RBC QN AUTO: 30.4 PG (ref 25.7–32.2)
MCHC RBC AUTO-ENTMCNC: 34.4 % (ref 32.3–36.5)
MCV RBC AUTO: 88.4 FL (ref 79–92.2)
MONOCYTES ABSOLUTE: 1 K/UL (ref 0.3–0.8)
MONOCYTES RELATIVE PERCENT: 7.4 % (ref 5.3–12.2)
NEUTROPHILS ABSOLUTE: 5.6 K/UL (ref 1.8–5.4)
NEUTROPHILS RELATIVE PERCENT: 42.3 % (ref 34–67.9)
PDW BLD-RTO: 12.5 % (ref 11.6–14.4)
PLATELET # BLD: 272 K/UL (ref 163–337)
POTASSIUM SERPL-SCNC: 3.7 MEQ/L (ref 3.4–4.9)
RBC # BLD: 4.67 M/UL (ref 4.63–6.08)
SLIDE REVIEW: ABNORMAL
SODIUM BLD-SCNC: 142 MEQ/L (ref 135–144)
TOTAL PROTEIN: 7.5 G/DL (ref 6.3–8)
WBC # BLD: 13.2 K/UL (ref 4.2–9)

## 2021-07-10 PROCEDURE — 99284 EMERGENCY DEPT VISIT MOD MDM: CPT

## 2021-07-10 PROCEDURE — 70450 CT HEAD/BRAIN W/O DYE: CPT

## 2021-07-10 PROCEDURE — 73610 X-RAY EXAM OF ANKLE: CPT

## 2021-07-10 PROCEDURE — 73030 X-RAY EXAM OF SHOULDER: CPT

## 2021-07-10 PROCEDURE — 36415 COLL VENOUS BLD VENIPUNCTURE: CPT

## 2021-07-10 PROCEDURE — 6360000002 HC RX W HCPCS: Performed by: EMERGENCY MEDICINE

## 2021-07-10 PROCEDURE — 80053 COMPREHEN METABOLIC PANEL: CPT

## 2021-07-10 PROCEDURE — 2580000003 HC RX 258: Performed by: EMERGENCY MEDICINE

## 2021-07-10 PROCEDURE — 72125 CT NECK SPINE W/O DYE: CPT

## 2021-07-10 PROCEDURE — 85025 COMPLETE CBC W/AUTO DIFF WBC: CPT

## 2021-07-10 PROCEDURE — 96374 THER/PROPH/DIAG INJ IV PUSH: CPT

## 2021-07-10 RX ORDER — KETOROLAC TROMETHAMINE 30 MG/ML
60 INJECTION, SOLUTION INTRAMUSCULAR; INTRAVENOUS ONCE
Status: DISCONTINUED | OUTPATIENT
Start: 2021-07-10 | End: 2021-07-10

## 2021-07-10 RX ORDER — KETOROLAC TROMETHAMINE 30 MG/ML
30 INJECTION, SOLUTION INTRAMUSCULAR; INTRAVENOUS ONCE
Status: COMPLETED | OUTPATIENT
Start: 2021-07-10 | End: 2021-07-10

## 2021-07-10 RX ORDER — KETOROLAC TROMETHAMINE 10 MG/1
10 TABLET, FILM COATED ORAL EVERY 6 HOURS PRN
Qty: 20 TABLET | Refills: 0 | Status: SHIPPED | OUTPATIENT
Start: 2021-07-10

## 2021-07-10 RX ORDER — 0.9 % SODIUM CHLORIDE 0.9 %
1000 INTRAVENOUS SOLUTION INTRAVENOUS ONCE
Status: COMPLETED | OUTPATIENT
Start: 2021-07-10 | End: 2021-07-10

## 2021-07-10 RX ORDER — CYCLOBENZAPRINE HCL 10 MG
10 TABLET ORAL 3 TIMES DAILY PRN
Qty: 30 TABLET | Refills: 0 | Status: SHIPPED | OUTPATIENT
Start: 2021-07-10 | End: 2021-07-20

## 2021-07-10 RX ADMIN — KETOROLAC TROMETHAMINE 30 MG: 30 INJECTION, SOLUTION INTRAMUSCULAR at 14:40

## 2021-07-10 RX ADMIN — SODIUM CHLORIDE 1000 ML: 9 INJECTION, SOLUTION INTRAVENOUS at 15:20

## 2021-07-10 ASSESSMENT — PAIN SCALES - GENERAL
PAINLEVEL_OUTOF10: 10
PAINLEVEL_OUTOF10: 10
PAINLEVEL_OUTOF10: 6

## 2021-07-10 ASSESSMENT — ENCOUNTER SYMPTOMS
NAUSEA: 0
ABDOMINAL PAIN: 0
DIARRHEA: 0
SHORTNESS OF BREATH: 0
VOMITING: 0
CONSTIPATION: 0
SORE THROAT: 0
COLOR CHANGE: 0
BACK PAIN: 0
COUGH: 0
EYE PAIN: 0
SINUS PRESSURE: 0
APNEA: 0
PHOTOPHOBIA: 0
RHINORRHEA: 0
WHEEZING: 0
ABDOMINAL DISTENTION: 0

## 2021-07-10 ASSESSMENT — PAIN DESCRIPTION - ORIENTATION
ORIENTATION: RIGHT;LEFT
ORIENTATION: RIGHT;LEFT

## 2021-07-10 ASSESSMENT — PAIN DESCRIPTION - ONSET
ONSET: SUDDEN
ONSET: ON-GOING

## 2021-07-10 ASSESSMENT — PAIN DESCRIPTION - PROGRESSION: CLINICAL_PROGRESSION: GRADUALLY IMPROVING

## 2021-07-10 ASSESSMENT — PAIN DESCRIPTION - PAIN TYPE: TYPE: ACUTE PAIN
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
## 2023-10-04 PROBLEM — Z87.81 HISTORY OF COMPRESSION FRACTURE OF VERTEBRAL COLUMN: Status: ACTIVE | Noted: 2023-10-04

## 2023-10-04 PROBLEM — E78.5 HYPERLIPIDEMIA: Status: ACTIVE | Noted: 2023-10-04

## 2023-10-04 PROBLEM — F17.200 TOBACCO USE DISORDER: Status: ACTIVE | Noted: 2023-10-04

## 2023-10-04 PROBLEM — M15.9 PRIMARY OSTEOARTHRITIS INVOLVING MULTIPLE JOINTS: Status: ACTIVE | Noted: 2023-10-04

## 2023-10-04 PROBLEM — E55.9 VITAMIN D DEFICIENCY: Status: ACTIVE | Noted: 2023-10-04

## 2023-10-04 PROBLEM — K58.0 IRRITABLE BOWEL SYNDROME WITH DIARRHEA: Status: ACTIVE | Noted: 2023-10-04

## 2023-10-04 PROBLEM — M15.0 PRIMARY OSTEOARTHRITIS INVOLVING MULTIPLE JOINTS: Status: ACTIVE | Noted: 2023-10-04

## 2023-10-04 PROBLEM — J45.20 MILD INTERMITTENT ASTHMA WITHOUT COMPLICATION (HHS-HCC): Status: ACTIVE | Noted: 2023-10-04

## 2023-10-04 RX ORDER — ACETAMINOPHEN 500 MG
1 TABLET ORAL DAILY
COMMUNITY
Start: 2020-07-23 | End: 2023-12-15 | Stop reason: SDUPTHER

## 2023-10-04 RX ORDER — FAMOTIDINE 20 MG/1
1 TABLET, FILM COATED ORAL 2 TIMES DAILY
COMMUNITY
Start: 2021-07-23

## 2023-10-04 RX ORDER — PREGABALIN 25 MG/1
25 CAPSULE ORAL 3 TIMES DAILY
COMMUNITY
Start: 2023-08-07 | End: 2023-12-15 | Stop reason: SDUPTHER

## 2023-10-04 RX ORDER — ERGOCALCIFEROL 1.25 MG/1
1 CAPSULE ORAL
COMMUNITY
Start: 2022-01-20

## 2023-10-04 RX ORDER — ALBUTEROL SULFATE 90 UG/1
AEROSOL, METERED RESPIRATORY (INHALATION)
COMMUNITY
Start: 2019-12-19

## 2023-10-04 RX ORDER — DICYCLOMINE HYDROCHLORIDE 20 MG/1
1 TABLET ORAL EVERY MORNING
COMMUNITY
Start: 2023-02-02 | End: 2023-12-15 | Stop reason: SDUPTHER

## 2023-10-04 RX ORDER — ROSUVASTATIN CALCIUM 5 MG/1
1 TABLET, COATED ORAL DAILY
COMMUNITY
Start: 2020-03-31 | End: 2023-12-15 | Stop reason: SDUPTHER

## 2023-10-04 RX ORDER — TIZANIDINE 4 MG/1
1 TABLET ORAL 3 TIMES DAILY PRN
COMMUNITY
Start: 2021-07-23

## 2023-10-05 ENCOUNTER — TREATMENT (OUTPATIENT)
Dept: PHYSICAL THERAPY | Facility: CLINIC | Age: 33
End: 2023-10-05
Payer: COMMERCIAL

## 2023-10-05 DIAGNOSIS — M25.572 ACUTE LEFT ANKLE PAIN: Primary | ICD-10-CM

## 2023-10-05 DIAGNOSIS — M25.552 PAIN OF LEFT HIP: ICD-10-CM

## 2023-10-05 DIAGNOSIS — M25.562 ACUTE PAIN OF LEFT KNEE: ICD-10-CM

## 2023-10-05 PROBLEM — M25.579 ANKLE PAIN: Status: ACTIVE | Noted: 2023-10-05

## 2023-10-05 PROBLEM — M25.559 HIP PAIN: Status: ACTIVE | Noted: 2023-10-05

## 2023-10-05 PROBLEM — M25.569 KNEE PAIN: Status: ACTIVE | Noted: 2023-10-05

## 2023-10-05 PROCEDURE — 97110 THERAPEUTIC EXERCISES: CPT | Mod: GP

## 2023-10-05 ASSESSMENT — PAIN SCALES - GENERAL: PAINLEVEL_OUTOF10: 4

## 2023-10-05 ASSESSMENT — ENCOUNTER SYMPTOMS
LOSS OF SENSATION IN FEET: 1
OCCASIONAL FEELINGS OF UNSTEADINESS: 1
DEPRESSION: 1

## 2023-10-05 ASSESSMENT — PAIN - FUNCTIONAL ASSESSMENT: PAIN_FUNCTIONAL_ASSESSMENT: 0-10

## 2023-10-05 NOTE — PROGRESS NOTES
"Physical Therapy Treatment    Patient Name: Shreyas Mazariegos  MRN: [id number]  Today's Date: 10/5/2023  Time Calculation  Start Time: 0240  Stop Time: 0323  Time Calculation (min): 43 min      Current Problem  1. Acute left ankle pain        2. Acute pain of left knee        3. Pain of left hip              Subjective     Insurance  Visit number: 11  Approved number of visits: 4/6   Authorization date range: 10/8/23   Authorization required after evaluation   [employer]       Fall Risk:         General   Pt states he is doing well overall and making good progress. Continues to feel the most pain in his hip and it is achy in nature.    Precautions   Light hip strengthening, ROM   Vital Signs       Pain  Pain Assessment: 0-10  Pain Score: 4  Pain Type: Acute pain  Pain Location: Hip  Pain Orientation: Left    Ortho  Objective   DF lacking 8  PF 0-10  Inv 0-25  Ev 0-5        Treatments:   Minisquats 2x10  SLS 10\" x10  Standing hip 3-way YTB x15  LAQ 1# 2x10  SLR flex/AB/ext  2x10  Prone HSC 1# 2x10  Active heelslides   3-way bridges 2x10  Clamshells YTB 2x10  Reverse clamshells 2x10  Rock board CW/CCW x10  OP EDUCATION:       Assessment:   Pt tolerated treatment session today. Increased pain with SLR flexion with weight, so weight taken away. Pt educated on not pushing too hard. Pt verbalized understanding. Pt educated on importance of continuing with ankle exercises at home in order to keep making gains in motion. Pt continues to demonstrate increases in overall strength. Pt will continue to benefit from skilled therapy in order to reach his goals.    Plan:   Continue with light strengthening of hip, ROM     "

## 2023-10-12 ENCOUNTER — TREATMENT (OUTPATIENT)
Dept: PHYSICAL THERAPY | Facility: CLINIC | Age: 33
End: 2023-10-12
Payer: COMMERCIAL

## 2023-10-12 DIAGNOSIS — M25.572 ACUTE LEFT ANKLE PAIN: Primary | ICD-10-CM

## 2023-10-12 DIAGNOSIS — M25.562 ACUTE PAIN OF LEFT KNEE: ICD-10-CM

## 2023-10-12 DIAGNOSIS — M25.552 PAIN OF LEFT HIP: ICD-10-CM

## 2023-10-12 PROCEDURE — 97110 THERAPEUTIC EXERCISES: CPT | Mod: GP

## 2023-10-12 ASSESSMENT — PAIN - FUNCTIONAL ASSESSMENT: PAIN_FUNCTIONAL_ASSESSMENT: 0-10

## 2023-10-12 ASSESSMENT — PAIN SCALES - GENERAL: PAINLEVEL_OUTOF10: 2

## 2023-10-12 NOTE — PROGRESS NOTES
"Physical Therapy Treatment    Patient Name: Shreyas Mazariegos  MRN: [id number]  Today's Date: 10/12/2023  Time Calculation  Start Time: 0200  Stop Time: 0238  Time Calculation (min): 38 min      Current Problem  No diagnosis found.        Subjective     Insurance  Visit number: 12  Approved number of visits: 5/6   Authorization date range: 10/8/23   Authorization required after evaluation   [employer]       Fall Risk:         General   Pt states he is doing well, still dealing with the hip pain but his main complaint would be lack of ankle movement.    Precautions   Light hip strengthening, ROM   Vital Signs       Pain  Pain Assessment: 0-10  Pain Score: 2  Pain Type: Acute pain  Pain Location: Hip  Pain Orientation: Left    Ortho  Objective   L ankle ROM  DF lacking 8  PF 0-10  Inv 0-25  Ev 0-5    ROM L knee 0-123    L MMT  Hip abd/add 4/5  Hip flex 4/5  Knee ext 4+/5  DF 4/5  PF 4/5  Inv 4/5  Ev 4/5  Treatments:   Minisquats 2x10  SLS 10\" x10 NT  Standing hip 3-way YTB x15  LAQ 1# 2x10  SLR flex/AB/ext  x10    3-way bridges 2x10  Clamshells YTB 2x10  Reverse clamshells 2x10  Rock board CW/CCW x10  Heel slides for DF x10  Wipers x10  Seated HR x15  OP EDUCATION:       Assessment:   Pt tolerated today's treatment session today. One instance of the ankle having a popping sensation but no increased pain with this. After given exercises ankle felt fatigued and had increased muscle soreness by end of session. Pt continues to have trouble with DF AROM. Therapist recommended pt wear AFO when walking d/t lack of DF for safety with gait. Pt verbalized understanding. Pt will continue to benefit from skilled therapy in order to reach his goals.    Plan:   Continue with light strengthening of hip, Ankle strength/ ROM    Pt will be independent with HEP.  Pt will increase LEFS score by 9 points (MDC) in order to return to activities of daily living.  - Baseline score 3/80 on 7/12/23 MET  New score 40/80 10/12/23  Pt will report " decrease in pain to 0-1/10 in order to return to doing self care activities. NOT MET  Pt will demonstrate increased knee flexion ROM to 0-120 in order to improve functional mobility. MET  Pt will demonstrate increase ankle DF/PF ROM to WNL in order to return to activities of daily living. PROGRESSING  Pt will demonstrate increased ankle Inv/Ev ROM to WNL in order to return to activities of daily living. PROGRESSING

## 2023-10-17 ENCOUNTER — APPOINTMENT (OUTPATIENT)
Dept: PHYSICAL THERAPY | Facility: CLINIC | Age: 33
End: 2023-10-17
Payer: COMMERCIAL

## 2023-10-18 NOTE — PROGRESS NOTES
"Physical Therapy Treatment    Patient Name: Shreyas Mazariegos  MRN: [id number]  Today's Date: 10/19/2023  Time Calculation  Start Time: 0335  Stop Time: 0421  Time Calculation (min): 46 min    Current Problem  1. Acute left ankle pain        2. Acute pain of left knee        3. Pain of left hip            Insurance:  Visit number: 13  Approved number of visits: 5/6   Authorization date range: 10/8/23   Authorization required after evaluation   [employer]     Precautions   Light hip strengthening, ROM     Subjective   Subjective:     Pain   3/10 in lateral L hip  Some tingling/numbness in L lower leg    Objective   Treatments:         Bike 5'        Step ups F/L 4in 20x   Minisquats on wobbleboard for DF 2x10   SLS 10\" x10 NT---  Standing hip 3-way YTB x15---  LAQ 2# 2x10  SLR flex/AB/ext  2x10     3-way bridges 2x10  Clamshells YTB 2x10  Reverse clamshells 2x10  Seated toe yoga 20x ea  Seated ankle 4-way RTB 20x ea  Eccentric ankle DF 10x  Rock board CW/CCW x10--  Heel slides for DF x10---  Wipers x10---   HR x20  OP EDUCATION:   Cues for ambulation      Assessment:   Pt tended to use momentum for many of the ex, like leg raises and clamshells. Introduced bike to help with more DF of the ankle. Pt lacked endurance with bike. Added step ups, which pt able to use fairly good form with. When pt tries to DF his ankle his toe halucus/digit muscles kick in. Pt use a lot of UE support with squats.     Plan:   Increase ankle DF and L LE strength              "

## 2023-10-19 ENCOUNTER — TREATMENT (OUTPATIENT)
Dept: PHYSICAL THERAPY | Facility: CLINIC | Age: 33
End: 2023-10-19
Payer: COMMERCIAL

## 2023-10-19 DIAGNOSIS — M25.552 PAIN OF LEFT HIP: ICD-10-CM

## 2023-10-19 DIAGNOSIS — M25.572 ACUTE LEFT ANKLE PAIN: Primary | ICD-10-CM

## 2023-10-19 DIAGNOSIS — M25.562 ACUTE PAIN OF LEFT KNEE: ICD-10-CM

## 2023-10-19 PROCEDURE — 97112 NEUROMUSCULAR REEDUCATION: CPT | Mod: GP,CQ

## 2023-10-19 PROCEDURE — 97110 THERAPEUTIC EXERCISES: CPT | Mod: GP,CQ

## 2023-10-23 NOTE — PROGRESS NOTES
"sPhysical Therapy Treatment    Patient Name: Shreyas Mazariegos  MRN: [id number]  Today's Date: 10/24/2023  Time Calculation  Start Time: 0333  Stop Time: 0419  Time Calculation (min): 46 min    Current Problem  1. Acute left ankle pain        2. Acute pain of left knee        3. Pain of left hip            Insurance:  Visit number: 14  Approved number of visits: 5/6   Authorization date range: 10/8/23   Authorization required after evaluation   [employer]     Precautions    Light hip strengthening, ROM     Subjective   Subjective:   Pt reports he has some pain in the center of his hip. He mowed some lawns today using a riding   Pain   3/10    Objective   Treatments:   IVONNE 5'        Step ups F/L 4in 20x       Step overs 4in 15x       Tap downs 4in 10x       Table ecc sits 15x   Wobble board F/B, S/S 10x ea        bridges 2x10  Clamshells RTB 2x10   SLR flex/VMO 2x10  Seated ankle 4-way RTB 20x ea  Eccentric ankle DF 10x  Ankle DF isometric (therapist asst) 5\"x10    Reverse clamshells 2x10---  Seated toe yoga 20x ea---  Rock board CW/CCW x10--  Heel slides for DF x10---  Wipers x10---   HR d13---Jcqwimhzpl on wobbleboard for DF 2x10 ---  SLS 10\" x10 NT---  NBOS on gray pad---  Standing hip 3-way YTB x15---  LAQ 2# 2x10---  AB/ext  2x10---    OP EDUCATION:   Postural awareness when amb      Assessment:   Had pt try bridges with R LE lift, but pt unable to maintain weight on the L side do to weakness. Pt had more difficulty with VMO SLR vs regular ones. Introduced step overs with cues for eccentric control. Pt had a little straining in the posterior ankle with this. Also added tap downs, which pt had to use a fair amount of UE support and had some knee pain. Normal fatigue following therapy session    Plan:   Improve ankle DF and gait            "

## 2023-10-24 ENCOUNTER — TREATMENT (OUTPATIENT)
Dept: PHYSICAL THERAPY | Facility: CLINIC | Age: 33
End: 2023-10-24
Payer: COMMERCIAL

## 2023-10-24 DIAGNOSIS — M25.572 ACUTE LEFT ANKLE PAIN: Primary | ICD-10-CM

## 2023-10-24 DIAGNOSIS — M25.562 ACUTE PAIN OF LEFT KNEE: ICD-10-CM

## 2023-10-24 DIAGNOSIS — M25.552 PAIN OF LEFT HIP: ICD-10-CM

## 2023-10-24 PROCEDURE — 97110 THERAPEUTIC EXERCISES: CPT | Mod: GP,CQ

## 2023-10-24 PROCEDURE — 97112 NEUROMUSCULAR REEDUCATION: CPT | Mod: GP,CQ

## 2023-10-26 ENCOUNTER — TREATMENT (OUTPATIENT)
Dept: PHYSICAL THERAPY | Facility: CLINIC | Age: 33
End: 2023-10-26
Payer: COMMERCIAL

## 2023-10-26 DIAGNOSIS — M25.572 ACUTE LEFT ANKLE PAIN: Primary | ICD-10-CM

## 2023-10-26 DIAGNOSIS — M25.552 PAIN OF LEFT HIP: ICD-10-CM

## 2023-10-26 PROCEDURE — 97140 MANUAL THERAPY 1/> REGIONS: CPT | Mod: GP

## 2023-10-26 PROCEDURE — 97110 THERAPEUTIC EXERCISES: CPT | Mod: GP

## 2023-10-26 ASSESSMENT — PAIN - FUNCTIONAL ASSESSMENT: PAIN_FUNCTIONAL_ASSESSMENT: 0-10

## 2023-10-26 ASSESSMENT — PAIN SCALES - GENERAL: PAINLEVEL_OUTOF10: 3

## 2023-10-26 NOTE — PROGRESS NOTES
Physical Therapy Treatment    Patient Name: Shreyas Mazariegos  MRN: [id number]  Today's Date: 10/27/2023  Time Calculation  Start Time: 1530  Stop Time: 1613  Time Calculation (min): 43 min      Assessment:  Pt tolerated today's session well with no increase in familiar symptoms. Pt was able to progress program with the addition of band bridges and DL leg press with no adverse reaction other than expected fatigue. Pt will continue to benefit from skilled physical therapy interventions to improve L lower extremity ROM, strength, endurance, and balance.      Plan:  OP PT Plan  PT Plan: Skilled PT (Focus on LE strength, endurance, and balance as tolerated)    Current Problem  1. Acute left ankle pain        2. Pain of left hip            Insurance: [facility] [place]  Visit Number: 15            Subjective   General   Pt reports that his L Leg is feeling ok with just a little soreness in his L hip and ankle. Pt denies any falls since his last visit. Pt reports full compliance with home exercise program.        Precautions  Precautions  Precautions Comment: none    Pain  Pain Assessment: 0-10  Pain Score: 3  Pain Location: Hip  Pain Orientation: Left    Objective   Pt arrives AMB [facility] with a slight antalgic gait with decreased stance time on the L LE. Pt has some slight L ankle DF.     Treatments:    Manual Therapy (02302):   Joint mobilizations: L ankle distraction, talocrural mobs for DF. Manual calf stretch    Appropriate force, direction, amplitude, & velocity for selected techniques to improve joint & soft tissue mobility & enhance ADL performance. Rationale & procedure given for above treatment techniques, & verbal consent was obtained prior to initiating treatment. Standard precautions, knowledge of appropriate benefits of treatment, & exclusion of relevant contraindications utilized.    Neuromuscular Reeducation (60554):     Therapeutic Exercise (13504):     Bridges: 10x  Band Bridges: 15x RTB  *STS squats: 15x  *SLR: 10x  *S/L  "hip ABD: 2x5  *Lunges on an 8\" step: 15x 3\"  Leg Press DL 60# x10, 80# x10, 100# x10  *SLS 3x30\" B/L  *SLS hip ABD/EXT 10x ea B/L      Step ups F/L 4in 20x NT   Step overs 4in 15x NT   Tap downs 4in 10x NT    (*denotes HEP)  Sheet with exercise descriptions and images issued. Skilled intervention utilized in the appropriate selection & application of above exercises. Verbal and tactile cues provided for proper form and technique. Pt. demonstrated appropriate form & verbalized understanding of optimal technique for above exercises.     Pt. Education:  The patient was educated on: the importance of positioning, proper posture, and body mechanics, joint mechanics and pathology, general tissue healing time, the appropriate use of heat and cold to control pain and inflammation, the importance of general therapeutic exercise, especially to stay within pain-free ROM, specific anatomy, function, & regional interdependence of involved areas, & likely cause of impairments & POC. Pt's questions were answered to their satisfaction, & pt. verbalized understanding & agreement with POC.     "

## 2023-10-30 NOTE — PROGRESS NOTES
"Physical Therapy Treatment    Patient Name: Shreyas Mazariegos  MRN: [id number]  Today's Date: 10/30/2023       Current Problem  1. Acute left ankle pain        2. Pain of left hip        3. Acute pain of left knee            Insurance:  [facility] [place]  Visit Number: 16     Precautions   None    Subjective   Subjective:   Pt reports that he is doing okay. He has a little hip and ankle pain. He hip pain feels kind of deep.  Pain   2/10    Objective   Treatments:  Joint mobilizations: L ankle distraction, talocrural mobs for DF. Manual calf stretch 4'    Bridges w/ march: 5x2  *STS squats: 15x  *SLR/VMO: 10x2  *S/L hip ABD: 2x10  S/L hamstring curl    *Lunges on an 8\" step: 15x 3\"  Leg Press DL 60# x10, 80# x10, 100# x10  Leg Press Eccentric 40# 5x  *SLS blue oval 2x30\"   *SLS hip ABD/EXT 10x ea B/L----  Step ups F/L 6in 15x   Step overs 4in 15x    Tap downs 4in 5x (+knee pain)  Wall squats 15x  Eccentric heel raises 15x    Eccentric ankle DF 10x  DF Isometrics 5\" x 10       OP EDUCATION:     Access Code: [email or web address]  URL: [email or web address]  Date: 10/31/2023  Prepared by: Honey Gonzalez    Exercises  - Wall Squat  - 1 x daily - 7 x weekly - 2 sets - 10 reps    Assessment:   Pt had difficulty maintaining the L LE in correct alignment to the body with s/l HSC. Progressed bridges to including marching. Pt had much less strength displayed when weight all on the L LE. Increased height for step ups. Pt had anterior knee pain with tap downs, so terminated after 5 reps. Introduced wall squats with verbal cues to keep weight evenly distributed when raising up. Pt fatigued quickly with leg press    Plan:   Cont to increase L hip/quad strength and ankle DF              "

## 2023-10-31 ENCOUNTER — TREATMENT (OUTPATIENT)
Dept: PHYSICAL THERAPY | Facility: CLINIC | Age: 33
End: 2023-10-31
Payer: COMMERCIAL

## 2023-10-31 DIAGNOSIS — M25.562 ACUTE PAIN OF LEFT KNEE: ICD-10-CM

## 2023-10-31 DIAGNOSIS — M25.552 PAIN OF LEFT HIP: ICD-10-CM

## 2023-10-31 DIAGNOSIS — M25.572 ACUTE LEFT ANKLE PAIN: Primary | ICD-10-CM

## 2023-10-31 PROCEDURE — 97110 THERAPEUTIC EXERCISES: CPT | Mod: GP,CQ

## 2023-11-02 ENCOUNTER — TREATMENT (OUTPATIENT)
Dept: PHYSICAL THERAPY | Facility: CLINIC | Age: 33
End: 2023-11-02
Payer: COMMERCIAL

## 2023-11-02 DIAGNOSIS — M25.562 ACUTE PAIN OF LEFT KNEE: ICD-10-CM

## 2023-11-02 DIAGNOSIS — M25.552 PAIN OF LEFT HIP: ICD-10-CM

## 2023-11-02 DIAGNOSIS — M25.572 ACUTE LEFT ANKLE PAIN: Primary | ICD-10-CM

## 2023-11-02 PROCEDURE — 97110 THERAPEUTIC EXERCISES: CPT | Mod: GP

## 2023-11-02 NOTE — PROGRESS NOTES
"Physical Therapy Treatment    Patient Name: Shreyas Mazariegos  MRN: [id number]  Today's Date: 11/2/2023       Current Problem  1. Acute left ankle pain        2. Pain of left hip        3. Acute pain of left knee              [place]  [facility] [place]: 17     Precautions   None    Subjective   Subjective:   Pt reports that he is doing okay. He has a little hip and ankle pain. He hip pain feels kind of deep.  Pain   2/10    Objective     L ankle ROM  DF 0-5  PF 0-20  Inv 0-25  Ev 0-25     ROM L knee 0-130     L MMT  Hip abd/add 4/5  Hip flex 4/5  Knee ext 4+/5  DF 4/5  PF 4/5  Inv 4+/5  Ev 4+/5  Treatments:  Joint mobilizations: L ankle distraction, talocrural mobs for DF. Manual calf stretch 4' Bridges w/ march: 5x2  *STS squats: 15x  *SLR/VMO: 10x2  *S/L hip ABD: 2x10  S/L hamstring curl    *Lunges on an 8\" step: 15x 3\"  ^Leg Press DL 80# x10, 100# x10, 120# x10  Leg Press Eccentric 40# 5x  *SLS blue oval 2x30\"   *SLS hip ABD/EXT 10x ea B/L----  Step ups F/L 6in 15x   Step overs 4in 15x    Tap downs 4in 5x (+knee pain)NT  Wall squats 15x NT- add back NV  Eccentric heel raises 15x    Eccentric ankle DF 10x  DF Isometrics 5\" x 10       OP EDUCATION:         Assessment:   Pt able to demonstrate increased knee and ankle ROM. He continues to also demonstrate increased strength globally. Still has most difficulty with ankle ROM and strength, and overall quad and hip strength. Pt lacks eccentric quad control. Pt fatigued by end of session today after progressions. Verbal cues for proper exercise form. Pt will continue to benefit from skilled therapy in order to reach his goals.    Plan:  OP PT Plan  Treatment/Interventions: Cryotherapy, Education/ Instruction, Gait training, Manual therapy, Neuromuscular re-education, Self care/ home management, Therapeutic activities, Therapeutic exercises  PT Plan: Skilled PT  PT Frequency: 2 times per week  Duration: 4 weeks  Onset Date: 11/02/23  Certification Period Start Date: " 11/02/23  Certification Period End Date: 12/02/23  Rehab Potential: Good  Plan of Care Agreement: PatientContinue to progress as tolerated with ROM and strength          GOALS  Pt will be independent with HEP.PROGRESSING  Pt will increase LEFS score by 9 points (MDC) in order to return to activities of daily living.  -       Baseline score 3/80 on 7/12/23 MET  New score 40/80 10/12/23PROGRESSING  Pt will report decrease in pain to 0-1/10 in order to return to doing self care activities. PROGRESSING  Pt will demonstrate increased knee flexion ROM to 0-120 in order to improve functional mobility. MET  Pt will demonstrate increase ankle DF/PF ROM to WNL in order to return to activities of daily living. PROGRESSING  Pt will demonstrate increased ankle Inv/Ev ROM to WNL in order to return to activities of daily living. PROGRESSING  Pt will demonstrate hip and knee strength 4+/5 in order to improve functional mobility. PROGRESSING  Pt will demonstrate ankle strength 4+/5 in order to improve gait mechanics.

## 2023-11-06 ENCOUNTER — TREATMENT (OUTPATIENT)
Dept: PHYSICAL THERAPY | Facility: CLINIC | Age: 33
End: 2023-11-06
Payer: COMMERCIAL

## 2023-11-06 DIAGNOSIS — M25.572 ACUTE LEFT ANKLE PAIN: Primary | ICD-10-CM

## 2023-11-06 DIAGNOSIS — M25.562 ACUTE PAIN OF LEFT KNEE: ICD-10-CM

## 2023-11-06 DIAGNOSIS — M25.552 PAIN OF LEFT HIP: ICD-10-CM

## 2023-11-06 PROCEDURE — 97110 THERAPEUTIC EXERCISES: CPT | Mod: GP,CQ

## 2023-11-06 ASSESSMENT — PAIN SCALES - GENERAL: PAINLEVEL_OUTOF10: 1

## 2023-11-06 ASSESSMENT — PAIN - FUNCTIONAL ASSESSMENT: PAIN_FUNCTIONAL_ASSESSMENT: 0-10

## 2023-11-06 NOTE — PROGRESS NOTES
Physical Therapy Treatment    Patient Name: Shreyas Mazariegos  MRN: [id number]  Today's Date: 11/6/2023  Time Calculation  Start Time: 0325  Stop Time: 0415  Time Calculation (min): 50 min    Current Problem  1. Acute left ankle pain        2. Pain of left hip        3. Acute pain of left knee            Insurance  MMO  Visit Number: 18    Subjective   General  Pt has begun to note some soreness through the shaft of his femur as the weather has begun to cool.  Precautions  None  Pain  Pain Assessment: 0-10  Pain Score: 1    Objective   Treatments:  IVONNE 6min  Leg press # 2x15  Leg press SL 40# 2x15  HSC 40# 2x15  Step ups f/l 6in x20  Step overs 6in x20  Tap downs f/l 4in 2x10  Wall squats 2x10  Eccentric HR --  SLS --  Standing hip 3-way --  Assessment   Pt tolerated treamtent very well.  Still with moderate LE weakness, but showing good improvements in functional strength and mobility.  Introduced SL leg press, HSC, and tap down exercises for continued strengthening and increased load for leg press DL.  Reviewed HEP.    Plan:  Progress with POC as tolerated.    OP EDUCATION:       Goals:

## 2023-11-08 ENCOUNTER — APPOINTMENT (OUTPATIENT)
Dept: PHYSICAL THERAPY | Facility: CLINIC | Age: 33
End: 2023-11-08
Payer: COMMERCIAL

## 2023-11-15 ENCOUNTER — TREATMENT (OUTPATIENT)
Dept: PHYSICAL THERAPY | Facility: CLINIC | Age: 33
End: 2023-11-15
Payer: COMMERCIAL

## 2023-11-15 DIAGNOSIS — M25.552 PAIN OF LEFT HIP: ICD-10-CM

## 2023-11-15 DIAGNOSIS — M25.572 ACUTE LEFT ANKLE PAIN: Primary | ICD-10-CM

## 2023-11-15 DIAGNOSIS — M25.562 ACUTE PAIN OF LEFT KNEE: ICD-10-CM

## 2023-11-15 PROCEDURE — 97110 THERAPEUTIC EXERCISES: CPT | Mod: GP

## 2023-11-15 NOTE — PROGRESS NOTES
Physical Therapy Treatment    Patient Name: Shreyas Mazariegos  MRN: [id number]  Today's Date: 11/16/2023  Time Calculation  Start Time: 0335  Stop Time: 0415  Time Calculation (min): 40 min    Current Problem  1. Acute left ankle pain        2. Pain of left hip        3. Acute pain of left knee              Insurance  MMO  Visit Number: 19    Subjective   General  Pt states he felt pretty good after his last visit. He has some hip pain coming into the session today, he states he had to run after one of his friends fell of their motorcycle and that caused his hip to ache.  Precautions  None  Pain   3/10    Objective   Treatments:  IVONNE 6min  Leg press # 2x15  Leg press SL ecc 60# x5  HSC 40# 2x15  Step ups f/l 6in x20  Step overs 6in x20  Tap downs f/l 4in 2x10  Wall squats 2x10 NT  Eccentric HR --  SLS --  Standing hip 3-way --  Tandem walking on airex 3L  Side stepping on airex 3L  Wobbleboard s/s f/b x10  Assessment   Pt tolerated treatment session well today. Pt continues to struggle with quadricep and hip strength demonstrated by noticeable fatigue. Pt also continues to struggle with stability of ankle, added in airex exercises to challenge him appropriately today. Pt continues to demonstrate improved gait. Pt will continue to benefit from skilled therapy in order to reach his goals.    Plan:  Progress with POC as tolerated with focus on global hip, knee, and ankle strength    OP EDUCATION:       Goals:

## 2023-11-20 ENCOUNTER — TREATMENT (OUTPATIENT)
Dept: PHYSICAL THERAPY | Facility: CLINIC | Age: 33
End: 2023-11-20
Payer: COMMERCIAL

## 2023-11-20 DIAGNOSIS — M25.572 ACUTE LEFT ANKLE PAIN: Primary | ICD-10-CM

## 2023-11-20 DIAGNOSIS — M25.562 ACUTE PAIN OF LEFT KNEE: ICD-10-CM

## 2023-11-20 DIAGNOSIS — M25.552 PAIN OF LEFT HIP: ICD-10-CM

## 2023-11-20 PROCEDURE — 97110 THERAPEUTIC EXERCISES: CPT | Mod: GP

## 2023-11-20 NOTE — PROGRESS NOTES
Physical Therapy Treatment    Patient Name: Shreyas Mazariegos  MRN: [id number]  Today's Date: 11/20/2023  Time Calculation  Start Time: 0245  Stop Time: 0326  Time Calculation (min): 41 min    Current Problem  1. Acute left ankle pain        2. Pain of left hip        3. Acute pain of left knee                Insurance  MMO  Visit Number: 19    Subjective   General  Pt continues to have same hip aching rated 3/10. States he felt pretty good after his last session.  Precautions  None  Pain   3/10    Objective   Treatments:  IVONNE 6min  Leg press # 2x15 NT  Leg press SL ecc 60# x5 NT  HSC 40# 2x15  ^Step ups f/l 8in x20  Step overs 6in x20  Tap downs f/l 4in 2x10 NT  Wall squats x10  Eccentric HR --  SLS --  Standing hip 3-way RTB 2x10  Tandem walking on airex 3L  Side stepping on airex 3L  Tandem stance balance 2x20s  Wobbleboard s/s f/b x10  Free motion fwd/bwd walking 15# x10  Assessment   Pt tolerated treatment session well today. Able to progress step height with good control. Pt able to demonstrate good eccentric control and balance with free motion exercises today. Fatigued after 3 way hip exercise today. Wall squat caused sme knee pain, terminated after 10. Pt will continue to benefit from skilled therapy in order to reach his goals.    Plan:  Progress with POC as tolerated with focus on global hip, knee, and ankle strength    OP EDUCATION:       Goals:

## 2023-11-22 ENCOUNTER — TREATMENT (OUTPATIENT)
Dept: PHYSICAL THERAPY | Facility: CLINIC | Age: 33
End: 2023-11-22
Payer: COMMERCIAL

## 2023-11-22 DIAGNOSIS — M25.552 PAIN OF LEFT HIP: ICD-10-CM

## 2023-11-22 DIAGNOSIS — M25.572 ACUTE LEFT ANKLE PAIN: Primary | ICD-10-CM

## 2023-11-22 DIAGNOSIS — M25.562 ACUTE PAIN OF LEFT KNEE: ICD-10-CM

## 2023-11-22 PROCEDURE — 97110 THERAPEUTIC EXERCISES: CPT | Mod: GP

## 2023-11-22 NOTE — PROGRESS NOTES
Physical Therapy Treatment    Patient Name: Shreyas Mazariegos  MRN: [id number]  Today's Date: 11/22/2023  Time Calculation  Start Time: 0330  Stop Time: 0408  Time Calculation (min): 38 min    Current Problem  1. Acute left ankle pain        2. Pain of left hip        3. Acute pain of left knee                  Insurance  MMO  Visit Number: 20    Subjective   General  Pt states his pain is more nerve related today and a 2/10 overall. States he just felt tired after last session.   Precautions  None  Pain   2/10    Objective   Treatments:  IVONNE 6min L2  Leg press # 2x15 NT  Leg press SL ecc 60# x5 NT  HSC 40# 2x15  ^Step ups f/l 8in x10 only forward today  Step overs 6in x20  Tap downs f/l 4in 2x10 NT  Wall squats x10 NT  Eccentric HR --  SLS --  Standing hip 3-way YTB 2x10  Tandem walking on airex 3L  Side stepping on airex 3L  Tandem stance balance 2x20s NT  Wobbleboard s/s f/b x10  Free motion fwd/bwd walking 15# x10  Standing TKE 2x10 2-3s  STS with black foam 2x10  Assessment   Pt had some increased pain with lateral step ups today, sharp in nature 4-5/10. Terminated exercise which brought pain back down to 2-3/10. Pt able to complete rest of exercises today with no increases in pain. Pt will continue to benefit from skilled therapy in order to reach his goals.    Plan:  Progress with POC as tolerated with focus on global hip, knee, and ankle strength    OP EDUCATION:       Goals:

## 2023-11-27 ENCOUNTER — TREATMENT (OUTPATIENT)
Dept: PHYSICAL THERAPY | Facility: CLINIC | Age: 33
End: 2023-11-27
Payer: COMMERCIAL

## 2023-11-27 DIAGNOSIS — M25.552 PAIN OF LEFT HIP: ICD-10-CM

## 2023-11-27 DIAGNOSIS — M25.562 ACUTE PAIN OF LEFT KNEE: ICD-10-CM

## 2023-11-27 DIAGNOSIS — M25.572 ACUTE LEFT ANKLE PAIN: Primary | ICD-10-CM

## 2023-11-27 PROCEDURE — 97110 THERAPEUTIC EXERCISES: CPT | Mod: GP

## 2023-11-27 NOTE — PROGRESS NOTES
"Physical Therapy Treatment    Patient Name: Shreyas Mazariegos  MRN: [id number]  Today's Date: 11/27/2023  Time Calculation  Start Time: 0334  Stop Time: 0412  Time Calculation (min): 38 min    Current Problem  1. Acute left ankle pain        2. Pain of left hip        3. Acute pain of left knee                    Insurance  MMO  Visit Number: 21    Subjective   General  Pt states pain is a 2/10 overall today. States he still ha a little bit of a limp. He reports he was able to walk 1/2 a mile on the treadmill today with some \"warm inflamed\" feel towards the end of the walk.  Precautions  None  Pain   2/10    Objective   Treatments:  IVONNE 6min L2  ^Leg press # 2x10   ^Leg press SL 50# x5 NT  ^HSC 50# 2x15  ^Step ups f/l 8in x20 only forward today  Step overs 6in x20  Tap downs f/l 4in 2x10 NT  Wall squats x10 NT  Eccentric HR --  SLS --  Standing hip 3-way YTB 2x10 NT  Tandem walking on airex 3L  Side stepping on airex 3L  Tandem stance balance 2x20s NT  Wobbleboard s/s f/b x10  Free motion fwd/bwd and side to side walking 15# x10, 10# x10 ea  Standing TKE 2x10 2-3s NT  STS with black foam 2x10 NT  Assessment   Pt tolerated treatment well today with no increase in pain. Continues to demonstrate improved gait mechanics overall. Still lacking quad strength and hip strength but continues to make small gains. Pt inquired about returning to gym. Pt educated on slow introduction to the gym and how he can over do it and not to do any jarring exercises or any extremely heavy weight. Pt verbalized understanding. Pt will benefit from skilled therapy in order to reach his goals.    Plan:  Progress with POC as tolerated with focus on global hip, knee, and ankle strength    OP EDUCATION:       Goals:     "

## 2023-11-29 ENCOUNTER — TREATMENT (OUTPATIENT)
Dept: PHYSICAL THERAPY | Facility: CLINIC | Age: 33
End: 2023-11-29
Payer: COMMERCIAL

## 2023-11-29 DIAGNOSIS — M25.562 ACUTE PAIN OF LEFT KNEE: ICD-10-CM

## 2023-11-29 DIAGNOSIS — M25.572 ACUTE LEFT ANKLE PAIN: Primary | ICD-10-CM

## 2023-11-29 DIAGNOSIS — M25.552 PAIN OF LEFT HIP: ICD-10-CM

## 2023-11-29 PROCEDURE — 97110 THERAPEUTIC EXERCISES: CPT | Mod: GP

## 2023-11-29 PROCEDURE — 97112 NEUROMUSCULAR REEDUCATION: CPT | Mod: GP

## 2023-11-29 NOTE — PROGRESS NOTES
Physical Therapy Treatment    Patient Name: Shreyas Mazariegos  MRN: [id number]  Today's Date: 11/29/2023  Time Calculation  Start Time: 0245  Stop Time: 0325  Time Calculation (min): 40 min    Current Problem  1. Acute left ankle pain        2. Pain of left hip        3. Acute pain of left knee                      Insurance  MMO  Visit Number: 22    Subjective   General  Pt states he is a little sore in his hip and knee today because he went to the gym and did some walking on a treadmill for about a half a mile. He states it is more a muscular soreness than anything else.  Precautions  None  Pain   2/10    Objective    ankle ROM  DF lacking 4  PF 0-20  Inv 0-21  Ev 0-20    ROM L knee 0-123    L MMT  Hip abd/add 4/5  Hip flex 4+/5  Knee ext 4+/5  DF 4/5  PF 4+/5  Inv 4/5  Ev 4/5    LEFS 43/80  Treatments:  IVONNE 6min L2  Goal review  Standing on black foam with lateral, posterior and anterior perturbations 2x30s  BOSU Step up  Leg press # 2x10   Leg press SL 50# x5 NT  ^HSC 50# 2x15NT  ^Step ups f/l 8in x20 only forward todayNT  Step overs 6in x20 NT  Tap downs f/l 4in 2x10 NT  Wall squats x10 NT  Eccentric HR --  SLS --  Standing hip 3-way YTB 2x10 NT  Tandem walking on airex 3L  Side stepping on airex 3L NT  Tandem stance balance 2x20s NT  Wobbleboard s/s f/b x10  Free motion fwd/bwd and side to side walking 17.5# x10, 12.5# x10 ea  Standing TKE 2x10 2-3s NT  STS with black foam 2x10 NT  Assessment   Pt has made significant progress on all goals, specifically strength and ROM. Pt functional strength continues to make small gains but overall still has limitations. Focus of therapy today on increasing balance in order to work on stabilizing muscles in ankle and hips. Pt was challenged with these exercises. Pt will continue to benefit from skilled therapy in order to improve overall functional mobility.    Plan:  Progress as tolerated with focus on global LE strengthening and functional balance    OP EDUCATION:        Goals:   Pt will be independent with HEP.  Pt will increase LEFS score by 9 points (MDC) in order to return to activities of daily living.  - Baseline score 3/80 on 7/12/23 MET  New score 40/80 10/12/23 PROGRESSING  Pt will report decrease in pain to 0-1/10 in order to return to doing self care activities. NOT MET  Pt will demonstrate increased knee flexion ROM to 0-120 in order to improve functional mobility. MET  Pt will demonstrate increase ankle DF/PF ROM to WNL in order to return to activities of daily living. PROGRESSING  Pt will demonstrate increased ankle Inv/Ev ROM to WNL in order to return to activities of daily living. PROGRESSING  Pt will demonstrate increased ankle strength to 4+/5 in order to improve functional mobility. PROGRESSING  Pt will demonstrate increased knee and hip strength to 5/5 in order to improve functional mobility PROGRESSING

## 2023-12-07 ENCOUNTER — APPOINTMENT (OUTPATIENT)
Dept: PHYSICAL THERAPY | Facility: CLINIC | Age: 33
End: 2023-12-07
Payer: COMMERCIAL

## 2023-12-14 ENCOUNTER — TREATMENT (OUTPATIENT)
Dept: PHYSICAL THERAPY | Facility: CLINIC | Age: 33
End: 2023-12-14
Payer: COMMERCIAL

## 2023-12-14 DIAGNOSIS — M25.572 ACUTE LEFT ANKLE PAIN: Primary | ICD-10-CM

## 2023-12-14 DIAGNOSIS — M25.562 ACUTE PAIN OF LEFT KNEE: ICD-10-CM

## 2023-12-14 DIAGNOSIS — M25.552 PAIN OF LEFT HIP: ICD-10-CM

## 2023-12-14 PROCEDURE — 97110 THERAPEUTIC EXERCISES: CPT | Mod: GP

## 2023-12-14 NOTE — PROGRESS NOTES
Physical Therapy Treatment    Patient Name: Shreyas Mazariegos  MRN: [id number]  Today's Date: 12/14/2023  Time Calculation  Start Time: 0330  Stop Time: 0408  Time Calculation (min): 38 min    Current Problem  1. Acute left ankle pain        2. Pain of left hip        3. Acute pain of left knee                        Insurance  MMO  Visit Number: 23    Subjective   General  Pt states he was able to reach 3/4 mile on the treadmill at the gym, still having some hip discomfrot and nerve pain that fires off on his ankle. MF FUV 12/18/23  Precautions  None  Pain   2/10    Objective     Treatments:  IVONNE 6min L2  Standing on black foam with lateral, posterior and anterior perturbations 2x30s NT  BOSU Step up NT  Leg press # 2x10   Leg press SL 50# x5 NT  ^HSC 50# 2x15NT  ^Step ups f/l 8in x20 only forward today  Step overs 6in x20   Tap downs f/l 4in 2x10 NT  Wall squats x10 NT  Eccentric HR --  SLS --  Standing hip 3-way RTB 2x10   SLS with half moon x10  Tandem walking on airex 3L  Side stepping on airex 3L NT  Tandem stance balance 2x20s NT  Wobbleboard s/s f/b x10  Free motion fwd/bwd and side to side walking 17.5# x10, 12.5# x10 ea  Standing TKE 2x10 2-3s NT  STS with black foam 2x10 NT  Assessment   Pt able to demonstrate improved gait and overall motion today. Able to continue to progress exercises with focus on SLS and quad strengthening. Pt still struggles with balance but overall getting better. Pt will continue to benefit from skilled therapy in order to reach his goals.    Plan:  Progress as tolerated with focus on global LE strengthening and functional balance. MD FUV 12/18/23    OP EDUCATION:       Goals:

## 2023-12-15 ENCOUNTER — OFFICE VISIT (OUTPATIENT)
Dept: PRIMARY CARE | Facility: CLINIC | Age: 33
End: 2023-12-15
Payer: COMMERCIAL

## 2023-12-15 VITALS
WEIGHT: 188 LBS | DIASTOLIC BLOOD PRESSURE: 81 MMHG | HEIGHT: 72 IN | RESPIRATION RATE: 16 BRPM | HEART RATE: 80 BPM | TEMPERATURE: 98.1 F | BODY MASS INDEX: 25.47 KG/M2 | SYSTOLIC BLOOD PRESSURE: 112 MMHG

## 2023-12-15 DIAGNOSIS — S82.302F: ICD-10-CM

## 2023-12-15 DIAGNOSIS — E78.5 HYPERLIPIDEMIA, UNSPECIFIED HYPERLIPIDEMIA TYPE: ICD-10-CM

## 2023-12-15 DIAGNOSIS — E55.9 VITAMIN D DEFICIENCY: ICD-10-CM

## 2023-12-15 DIAGNOSIS — G62.9 NEUROPATHY: Primary | ICD-10-CM

## 2023-12-15 DIAGNOSIS — K58.0 IRRITABLE BOWEL SYNDROME WITH DIARRHEA: ICD-10-CM

## 2023-12-15 DIAGNOSIS — M25.562 ACUTE PAIN OF LEFT KNEE: ICD-10-CM

## 2023-12-15 PROBLEM — S82.302C: Status: ACTIVE | Noted: 2023-06-08

## 2023-12-15 PROBLEM — S32.402A: Status: ACTIVE | Noted: 2023-06-09

## 2023-12-15 PROBLEM — S32.9XXA: Status: ACTIVE | Noted: 2023-06-09

## 2023-12-15 PROCEDURE — 99214 OFFICE O/P EST MOD 30 MIN: CPT | Performed by: FAMILY MEDICINE

## 2023-12-15 PROCEDURE — 4004F PT TOBACCO SCREEN RCVD TLK: CPT | Performed by: FAMILY MEDICINE

## 2023-12-15 RX ORDER — DICYCLOMINE HYDROCHLORIDE 20 MG/1
20 TABLET ORAL EVERY MORNING
Qty: 90 TABLET | Refills: 3 | Status: SHIPPED | OUTPATIENT
Start: 2023-12-15

## 2023-12-15 RX ORDER — ROSUVASTATIN CALCIUM 5 MG/1
5 TABLET, COATED ORAL DAILY
Qty: 90 TABLET | Refills: 3 | Status: SHIPPED | OUTPATIENT
Start: 2023-12-15

## 2023-12-15 RX ORDER — PREGABALIN 25 MG/1
25 CAPSULE ORAL 3 TIMES DAILY
Qty: 270 CAPSULE | Refills: 1 | Status: SHIPPED | OUTPATIENT
Start: 2023-12-15 | End: 2024-06-12

## 2023-12-15 RX ORDER — ACETAMINOPHEN 500 MG
2000 TABLET ORAL DAILY
Qty: 90 CAPSULE | Refills: 3 | Status: SHIPPED | OUTPATIENT
Start: 2023-12-15
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
## 2023-12-21 ENCOUNTER — TREATMENT (OUTPATIENT)
Dept: PHYSICAL THERAPY | Facility: CLINIC | Age: 33
End: 2023-12-21
Payer: COMMERCIAL

## 2023-12-21 DIAGNOSIS — M25.552 PAIN OF LEFT HIP: ICD-10-CM

## 2023-12-21 DIAGNOSIS — M25.572 ACUTE LEFT ANKLE PAIN: Primary | ICD-10-CM

## 2023-12-21 DIAGNOSIS — M25.562 ACUTE PAIN OF LEFT KNEE: ICD-10-CM

## 2023-12-21 PROCEDURE — 97110 THERAPEUTIC EXERCISES: CPT | Mod: GP
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
## 2023-12-28 ENCOUNTER — TREATMENT (OUTPATIENT)
Dept: PHYSICAL THERAPY | Facility: CLINIC | Age: 33
End: 2023-12-28
Payer: COMMERCIAL

## 2023-12-28 DIAGNOSIS — M25.552 PAIN OF LEFT HIP: ICD-10-CM

## 2023-12-28 DIAGNOSIS — M25.572 ACUTE LEFT ANKLE PAIN: Primary | ICD-10-CM

## 2023-12-28 DIAGNOSIS — M25.562 ACUTE PAIN OF LEFT KNEE: ICD-10-CM

## 2023-12-28 PROCEDURE — 97110 THERAPEUTIC EXERCISES: CPT | Mod: GP

## 2023-12-28 NOTE — PROGRESS NOTES
Physical Therapy Treatment    Patient Name: Shreyas Mazariegos  MRN: [id number]  Today's Date: 12/28/2023  Time Calculation  Start Time: 0330  Stop Time: 0345  Time Calculation (min): 15 min    Current Problem  1. Acute left ankle pain        2. Pain of left hip        3. Acute pain of left knee                            Insurance  MMO  Visit Number: 25    Subjective   General  Pt states he has some pain in his foot today and his leg feels like it has a cuff from the ankle to the knee. He states he has no sharp pains and no new swelling in the surgical leg. He feels he is ready for discharge today.  Precautions  None  Pain   1/10    Objective    ankle ROM  DF lacking 4 - neutral with PROM  PF 0-22  Inv 0-32  Ev 0-20    ROM L knee 0-140    L MMT  Hip abd/add 4+/5  Hip flex 4+/5  Knee ext 4+/5  DF 4+/5  PF 4+/5  Inv 4+/5  Ev 4+/5    LEFS 52/80  Treatments:  Goal review billed as therex  Flight of stairs  Review of HEP and safe gym usage    NT:    IVONNE 6min L2  Bridge with RTB 2x10  SLR abd 2x10  Leg press # 2x10   Leg press SL 50# x10  ^Step ups f/l 8in x20 only forward today  Step overs 6in x20   SLS with half moon x10  Standing on black foam 3x15s      Standing on black foam with lateral, posterior and anterior perturbations 2x30s NT  BOSU Step up NT  ^HSC 50# 2x15NT  Tap downs f/l 4in 2x10 NT  Wall squats x10 NT  Eccentric HR --  SLS --  Standing hip 3-way RTB 2x10   Tandem walking on airex 3L  Side stepping on airex 3L NT  Tandem stance balance 2x20s NT  Wobbleboard s/s f/b x10  Free motion fwd/bwd and side to side walking 17.5# x10, 12.5# x10 ea  Standing TKE 2x10 2-3s NT  STS with black foam 2x10 NT  Assessment   Pt has made significant improvements since starting therapy. Pt made most significant improvements in ROM, strength and gait. Pt now able to do all activities and walk without assistive device. Pt educated on HEP and proper usage of gym equipment, what exercises he can do at gym safely at this time.  Pt verbalized understanding. Cuff sensation pt is feeling seems to be nerve related, pt educated to keep an eye on ti and let his doctor know if it gets worse. Pt verbalized understanding. No swelling, redness or TTP noted. Pt will be discharged today with HEP, encouraged to call with any questions or concerns.    Plan:  Pt discharged with HEP, encouraged to call with any questions or concerns at this time.    OP EDUCATION:       Goals:  Goals:   Pt will be independent with HEP. MET  Pt will increase LEFS score by 9 points (MDC) in order to return to activities of daily living.  - Baseline score 3/80 on 7/12/23 MET  New score 52/80 10/12/23 PROGRESSING  Pt will report decrease in pain to 0-1/10 in order to return to doing self care activities. MET  Pt will demonstrate increased knee flexion ROM to 0-120 in order to improve functional mobility. MET  Pt will demonstrate increase ankle DF/PF ROM to WNL in order to return to activities of daily living. Partially met  Pt will demonstrate increased ankle Inv/Ev ROM to WNL in order to return to activities of daily living. MET  Pt will demonstrate increased ankle strength to 4+/5 in order to improve functional mobility. PROGRESSING  Pt will demonstrate increased knee and hip strength to 5/5 in order to improve functional mobility Partially met

## 2024-01-11 ENCOUNTER — APPOINTMENT (OUTPATIENT)
Dept: PRIMARY CARE | Facility: CLINIC | Age: 34
End: 2024-01-11
Payer: COMMERCIAL

## 2024-06-24 ENCOUNTER — APPOINTMENT (OUTPATIENT)
Dept: PRIMARY CARE | Facility: CLINIC | Age: 34
End: 2024-06-24
Payer: COMMERCIAL

## 2024-07-25 ENCOUNTER — TELEPHONE (OUTPATIENT)
Dept: PRIMARY CARE | Facility: CLINIC | Age: 34
End: 2024-07-25

## 2024-07-25 ENCOUNTER — APPOINTMENT (OUTPATIENT)
Dept: PRIMARY CARE | Facility: CLINIC | Age: 34
End: 2024-07-25
Payer: COMMERCIAL

## 2024-07-25 VITALS
RESPIRATION RATE: 16 BRPM | WEIGHT: 184 LBS | BODY MASS INDEX: 24.92 KG/M2 | HEIGHT: 72 IN | TEMPERATURE: 98.1 F | HEART RATE: 82 BPM | SYSTOLIC BLOOD PRESSURE: 132 MMHG | DIASTOLIC BLOOD PRESSURE: 75 MMHG

## 2024-07-25 DIAGNOSIS — E78.5 HYPERLIPIDEMIA, UNSPECIFIED HYPERLIPIDEMIA TYPE: ICD-10-CM

## 2024-07-25 DIAGNOSIS — K21.9 GASTROESOPHAGEAL REFLUX DISEASE WITHOUT ESOPHAGITIS: ICD-10-CM

## 2024-07-25 DIAGNOSIS — M25.562 ACUTE PAIN OF LEFT KNEE: ICD-10-CM

## 2024-07-25 DIAGNOSIS — G62.9 NEUROPATHY: Primary | ICD-10-CM

## 2024-07-25 DIAGNOSIS — E55.9 VITAMIN D DEFICIENCY: ICD-10-CM

## 2024-07-25 DIAGNOSIS — J45.20 MILD INTERMITTENT ASTHMA WITHOUT COMPLICATION (HHS-HCC): ICD-10-CM

## 2024-07-25 PROBLEM — M25.569 KNEE PAIN: Status: RESOLVED | Noted: 2023-10-05 | Resolved: 2024-07-25

## 2024-07-25 PROBLEM — K58.0 IRRITABLE BOWEL SYNDROME WITH DIARRHEA: Status: RESOLVED | Noted: 2023-10-04 | Resolved: 2024-07-25

## 2024-07-25 PROCEDURE — 99214 OFFICE O/P EST MOD 30 MIN: CPT | Performed by: FAMILY MEDICINE

## 2024-07-25 PROCEDURE — 3008F BODY MASS INDEX DOCD: CPT | Performed by: FAMILY MEDICINE

## 2024-07-25 RX ORDER — PREGABALIN 25 MG/1
25 CAPSULE ORAL 3 TIMES DAILY
Qty: 270 CAPSULE | Refills: 1 | Status: SHIPPED | OUTPATIENT
Start: 2024-07-25 | End: 2025-01-21

## 2024-07-25 RX ORDER — FAMOTIDINE 20 MG/1
20 TABLET, FILM COATED ORAL 2 TIMES DAILY
Qty: 180 TABLET | Refills: 3 | Status: SHIPPED | OUTPATIENT
Start: 2024-07-25

## 2024-07-25 RX ORDER — ALBUTEROL SULFATE 90 UG/1
2 AEROSOL, METERED RESPIRATORY (INHALATION) EVERY 6 HOURS PRN
Qty: 18 G | Refills: 0 | Status: SHIPPED | OUTPATIENT
Start: 2024-07-25

## 2024-07-25 RX ORDER — ACETAMINOPHEN 500 MG
2000 TABLET ORAL DAILY
Qty: 90 CAPSULE | Refills: 3 | Status: SHIPPED | OUTPATIENT
Start: 2024-07-25

## 2024-07-25 RX ORDER — ROSUVASTATIN CALCIUM 5 MG/1
5 TABLET, COATED ORAL DAILY
Qty: 90 TABLET | Refills: 3 | Status: SHIPPED | OUTPATIENT
Start: 2024-07-25

## 2024-07-25 NOTE — ASSESSMENT & PLAN NOTE
He has been severely deficient for the last 5 years and in my opinion requires daily oral supplementation which we will resume today.  Orders:    cholecalciferol (Vitamin D-3) 50 mcg (2,000 unit) capsule; Take 1 capsule (50 mcg) by mouth early in the morning..

## 2024-07-25 NOTE — ASSESSMENT & PLAN NOTE
We will resume his statin therapy of Crestor 5 mg  Orders:    rosuvastatin (Crestor) 5 mg tablet; Take 1 tablet (5 mg) by mouth once daily.
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
## 2024-07-25 NOTE — LETTER
July 25, 2024     Shreyas Mazariegos  [address] [place] [id number]    Patient: Shreyas Mazariegos   YOB: 1990   Date of Visit: 7/25/2024       Dear Dr. Shreyas Mazariegos:    Thank you for referring Shreyas Mazariegos to me for evaluation. Below are my notes for this consultation.  If you have questions, please do not hesitate to call me. I look forward to following your patient along with you.       Sincerely,     Kiel Lynn MD      CC: No Recipients  ______________________________________________________________________________________    N/A. ERROR. -AH    Error. -AH

## 2024-07-25 NOTE — TELEPHONE ENCOUNTER
Pt is requesting a handicap placard. He said it was discussed at his appt, but I don't see it in the chart. Can you put this in or print it if I'm missing it? Thank you!

## 2024-07-25 NOTE — ASSESSMENT & PLAN NOTE
He is symptomatic again after running out of his medications so I will resume his famotidine routine.  Orders:    famotidine (Pepcid) 20 mg tablet; Take 1 tablet (20 mg) by mouth 2 times a day.

## 2024-07-25 NOTE — ASSESSMENT & PLAN NOTE
He treats with albuterol inhaler only when symptomatic which is less than once a months.  We will refill the inhaler  Orders:    albuterol (Ventolin HFA) 90 mcg/actuation inhaler; Inhale 2 puffs every 6 hours if needed for wheezing or shortness of breath.

## 2025-01-30 ENCOUNTER — APPOINTMENT (OUTPATIENT)
Dept: PRIMARY CARE | Facility: CLINIC | Age: 35
End: 2025-01-30
Payer: COMMERCIAL

## 2025-01-30 VITALS
HEART RATE: 78 BPM | RESPIRATION RATE: 16 BRPM | SYSTOLIC BLOOD PRESSURE: 109 MMHG | DIASTOLIC BLOOD PRESSURE: 71 MMHG | WEIGHT: 187 LBS | BODY MASS INDEX: 25.33 KG/M2 | TEMPERATURE: 97.8 F | HEIGHT: 72 IN

## 2025-01-30 DIAGNOSIS — G62.9 NEUROPATHY: ICD-10-CM

## 2025-01-30 DIAGNOSIS — M25.562 ACUTE PAIN OF LEFT KNEE: ICD-10-CM

## 2025-01-30 DIAGNOSIS — Z00.00 ROUTINE GENERAL MEDICAL EXAMINATION AT A HEALTH CARE FACILITY: ICD-10-CM

## 2025-01-30 DIAGNOSIS — K21.9 GASTROESOPHAGEAL REFLUX DISEASE WITHOUT ESOPHAGITIS: ICD-10-CM

## 2025-01-30 DIAGNOSIS — Z11.59 ENCOUNTER FOR HEPATITIS C SCREENING TEST FOR LOW RISK PATIENT: Primary | ICD-10-CM

## 2025-01-30 PROCEDURE — 3008F BODY MASS INDEX DOCD: CPT | Performed by: FAMILY MEDICINE

## 2025-01-30 PROCEDURE — 99214 OFFICE O/P EST MOD 30 MIN: CPT | Performed by: FAMILY MEDICINE

## 2025-01-30 PROCEDURE — 4004F PT TOBACCO SCREEN RCVD TLK: CPT | Performed by: FAMILY MEDICINE

## 2025-01-30 RX ORDER — PREGABALIN 25 MG/1
25 CAPSULE ORAL 3 TIMES DAILY
Qty: 270 CAPSULE | Refills: 1 | Status: SHIPPED | OUTPATIENT
Start: 2025-01-30 | End: 2025-07-29

## 2025-01-30 RX ORDER — FAMOTIDINE 20 MG/1
20 TABLET, FILM COATED ORAL 2 TIMES DAILY
Qty: 180 TABLET | Refills: 3 | Status: SHIPPED | OUTPATIENT
Start: 2025-01-30

## 2025-01-30 ASSESSMENT — ENCOUNTER SYMPTOMS: NUMBNESS: 1
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
## 2025-02-07 NOTE — PROGRESS NOTES
"Physical Therapy Treatment    Patient Name: Shreyas Mazariegos  MRN: [id number]  Today's Date: 10/16/2023       Current Problem  1. Acute left ankle pain        2. Acute pain of left knee        3. Pain of left hip            Insurance:  Visit number: 12  Approved number of visits: 5/6   Authorization date range: 10/8/23   Authorization required after evaluation   [employer]     Precautions   Light hip strengthening, ROM     Subjective   Subjective:     Pain       Objective   Treatments:  Minisquats 2x10  SLS 10\" x10 NT  Standing hip 3-way YTB x15  LAQ 1# 2x10  SLR flex/AB/ext  x10     3-way bridges 2x10  Clamshells YTB 2x10  Reverse clamshells 2x10  Rock board CW/CCW x10  Heel slides for DF x10  Wipers x10  Seated HR x15  OP EDUCATION:         Assessment:       Plan:                 "
15

## 2025-07-30 ENCOUNTER — APPOINTMENT (OUTPATIENT)
Dept: PRIMARY CARE | Facility: CLINIC | Age: 35
End: 2025-07-30
Payer: COMMERCIAL

## 2025-07-30 VITALS
BODY MASS INDEX: 26.82 KG/M2 | HEIGHT: 72 IN | TEMPERATURE: 97.8 F | SYSTOLIC BLOOD PRESSURE: 111 MMHG | DIASTOLIC BLOOD PRESSURE: 73 MMHG | HEART RATE: 78 BPM | RESPIRATION RATE: 16 BRPM | WEIGHT: 198 LBS

## 2025-07-30 DIAGNOSIS — J45.20 MILD INTERMITTENT ASTHMA WITHOUT COMPLICATION (HHS-HCC): ICD-10-CM

## 2025-07-30 DIAGNOSIS — S39.012A LUMBOSACRAL STRAIN, INITIAL ENCOUNTER: Primary | ICD-10-CM

## 2025-07-30 DIAGNOSIS — F17.200 TOBACCO USE DISORDER: ICD-10-CM

## 2025-07-30 DIAGNOSIS — G62.9 NEUROPATHY: ICD-10-CM

## 2025-07-30 PROBLEM — Z87.81 HISTORY OF FRACTURE OF TIBIA: Status: ACTIVE | Noted: 2023-06-08

## 2025-07-30 PROBLEM — Z87.81 HISTORY OF FRACTURE OF LEFT HIP: Status: ACTIVE | Noted: 2023-06-09

## 2025-07-30 PROBLEM — Z87.81 HISTORY OF FRACTURE OF PELVIS: Status: ACTIVE | Noted: 2023-06-09

## 2025-07-30 PROCEDURE — 3008F BODY MASS INDEX DOCD: CPT | Performed by: FAMILY MEDICINE

## 2025-07-30 PROCEDURE — 99213 OFFICE O/P EST LOW 20 MIN: CPT | Performed by: FAMILY MEDICINE

## 2025-07-30 ASSESSMENT — PATIENT HEALTH QUESTIONNAIRE - PHQ9
2. FEELING DOWN, DEPRESSED OR HOPELESS: NOT AT ALL
SUM OF ALL RESPONSES TO PHQ9 QUESTIONS 1 AND 2: 0
1. LITTLE INTEREST OR PLEASURE IN DOING THINGS: NOT AT ALL

## 2025-07-30 ASSESSMENT — ENCOUNTER SYMPTOMS: BACK PAIN: 1

## 2025-07-30 NOTE — ASSESSMENT & PLAN NOTE
This is chronic and he has chosen to stop using any medications for this.  He does still use medical marijuana which is giving him a reasonable degree of relief of symptoms but he has constant numbness and tingling on the side.  He has constant hyperreflexia of the left leg.  Orders:    Follow Up In [facility] - PCP

## 2025-07-30 NOTE — ASSESSMENT & PLAN NOTE
Although he is only 35 years old he has a very heavy history of tobacco use.  He did quit smoking cigarettes but has switched to vaping and it sounds like he is actually significantly increased his tobacco nicotine intake.  He is now awakening multiple times at night needing nicotine.  I strongly suggest that he work on weaning himself down and offered help with considering the nicotine replacement system like the nicotine patch when he is ready
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
## (undated) DEVICE — MEDI-VAC NON-CONDUCTIVE SUCTION TUBING: Brand: CARDINAL HEALTH

## (undated) DEVICE — WAND ABLAT DIA3.5MM PRB ENERGY SUCT 50-S SERFAS

## (undated) DEVICE — CHLORAPREP 26ML ORANGE

## (undated) DEVICE — SYRINGE MED 30ML STD CLR PLAS LUERLOCK TIP N CTRL DISP

## (undated) DEVICE — 3M™ COBAN™ STERILE SELF-ADHERENT WRAP, 1584S, 4 IN X 5 YD (10 CM X 4,5 M), 18 ROLLS/CASE: Brand: 3M™ COBAN™

## (undated) DEVICE — UNDERCAST PADDING: Brand: DEROYAL

## (undated) DEVICE — 1842 FOAM BLOCK NEEDLE COUNTER: Brand: DEVON

## (undated) DEVICE — LABEL MED MINI W/ MARKER

## (undated) DEVICE — Z DISCONTINUED NO SUB IDED SOLUTION ANTISEP 1GAL 70% ISO ALC CLR W BLU TINT ALC RUBBING

## (undated) DEVICE — STERILE POLYISOPRENE POWDER-FREE SURGICAL GLOVES: Brand: PROTEXIS

## (undated) DEVICE — SHEET,DRAPE,53X77,STERILE: Brand: MEDLINE

## (undated) DEVICE — MAT FLR SURG QUICKWICK 28X54 IN DISP

## (undated) DEVICE — GLOVE SURG SZ 8 L11.77IN FNGR THK9.8MIL STRW LTX POLYMER

## (undated) DEVICE — PACK ARTHRO III ST SIRUS

## (undated) DEVICE — 3000CC GUARDIAN II: Brand: GUARDIAN

## (undated) DEVICE — SMARTGOWN BREATHABLE SPECIALTY GOWN: Brand: CONVERTORS

## (undated) DEVICE — PAD,ABDOMINAL,8"X10",ST,LF: Brand: MEDLINE

## (undated) DEVICE — SUTURE VCRL SZ 2-0 L27IN ABSRB UD L26MM CT-2 1/2 CIR J269H

## (undated) DEVICE — INTENDED FOR TISSUE SEPARATION, AND OTHER PROCEDURES THAT REQUIRE A SHARP SURGICAL BLADE TO PUNCTURE OR CUT.: Brand: BARD-PARKER ® CARBON RIB-BACK BLADES

## (undated) DEVICE — [TOMCAT CUTTER, ARTHROSCOPIC SHAVER BLADE,  DO NOT RESTERILIZE,  DO NOT USE IF PACKAGE IS DAMAGED,  KEEP DRY,  KEEP AWAY FROM SUNLIGHT]: Brand: FORMULA

## (undated) DEVICE — MEDI-VAC TUBING CONNECT "T" POLYPROPYLENE: Brand: CARDINAL HEALTH

## (undated) DEVICE — DRESSING GZ W1XL8IN COT XRFRM N ADH OVERWRAP CURAD

## (undated) DEVICE — GAUZE SPONGES,12 PLY: Brand: CURITY

## (undated) DEVICE — GOWN,AURORA,NONREINFORCED,LARGE: Brand: MEDLINE

## (undated) DEVICE — GLOVE SURG SZ 7 L11.33IN FNGR THK9.8MIL STRW LTX POLYMER

## (undated) DEVICE — SKIN MARKER,REGULAR TIP WITH RULER: Brand: DEVON

## (undated) DEVICE — BANDAGE COMPR SGL LAYERED CLP CLSR E 33FT LEN 4IN W TETRA

## (undated) DEVICE — CONVERTED USE 291618 SPONGE LAPAROTOMY POCKET POUCH RING 18

## (undated) DEVICE — ZIMMER® STERILE DISPOSABLE TOURNIQUET CUFF, DUAL PORT, SINGLE BLADDER, 24 IN. (61 CM)

## (undated) DEVICE — NEEDLE SPNL L3 1/2IN OD18GA DMND PT PLAS GRN HUB SENSTCH

## (undated) DEVICE — 3M™ STERI-STRIP™ REINFORCED ADHESIVE SKIN CLOSURES, R1547, 1/2 IN X 4 IN (12 MM X 100 MM), 6 STRIPS/ENVELOPE: Brand: 3M™ STERI-STRIP™

## (undated) DEVICE — 3M™ STERI-DRAPE™ U-DRAPE 1015: Brand: STERI-DRAPE™

## (undated) DEVICE — GLOVE SURG SZ 8 L11.2IN FNGR THK12.7MIL CUF THK9.7MIL BRN